# Patient Record
Sex: MALE | Race: WHITE | NOT HISPANIC OR LATINO | Employment: FULL TIME | URBAN - METROPOLITAN AREA
[De-identification: names, ages, dates, MRNs, and addresses within clinical notes are randomized per-mention and may not be internally consistent; named-entity substitution may affect disease eponyms.]

---

## 2017-01-06 ENCOUNTER — OFFICE VISIT (OUTPATIENT)
Dept: INTERNAL MEDICINE | Facility: CLINIC | Age: 37
End: 2017-01-06

## 2017-01-06 VITALS
SYSTOLIC BLOOD PRESSURE: 122 MMHG | DIASTOLIC BLOOD PRESSURE: 88 MMHG | TEMPERATURE: 98.6 F | RESPIRATION RATE: 12 BRPM | BODY MASS INDEX: 25.62 KG/M2 | HEART RATE: 66 BPM | OXYGEN SATURATION: 99 % | WEIGHT: 179 LBS | HEIGHT: 70 IN

## 2017-01-06 DIAGNOSIS — L30.9 ECZEMA, UNSPECIFIED TYPE: ICD-10-CM

## 2017-01-06 DIAGNOSIS — E78.5 DYSLIPIDEMIA: ICD-10-CM

## 2017-01-06 DIAGNOSIS — Z00.00 HEALTHCARE MAINTENANCE: Primary | ICD-10-CM

## 2017-01-06 DIAGNOSIS — E55.9 AVITAMINOSIS D: ICD-10-CM

## 2017-01-06 DIAGNOSIS — N40.3 NODULAR PROSTATE WITH LOWER URINARY TRACT SYMPTOMS: ICD-10-CM

## 2017-01-06 PROCEDURE — 99395 PREV VISIT EST AGE 18-39: CPT | Performed by: INTERNAL MEDICINE

## 2017-01-06 RX ORDER — MELATONIN
1000 DAILY
Qty: 30 TABLET | Refills: 5
Start: 2017-01-06 | End: 2018-04-03

## 2017-01-06 NOTE — MR AVS SNAPSHOT
Eriberto Cleveland   1/6/2017 3:20 PM   Office Visit    Dept Phone:  [phone]   Encounter #:  [id number]    Provider:  Dada Hernandez MD   Department:  [facility] INTERNAL MEDICINE                Your Full Care Plan              Today's Medication Changes          These changes are accurate as of: 1/6/17  4:26 PM.  If you have any questions, ask your nurse or doctor.               New Medication(s)Ordered:     cholecalciferol 1000 UNITS tablet   Commonly known as:  VITAMIN D3   Take 1 tablet by mouth Daily.   Started by:  Dada Hernandez MD            Where to Get Your Medications      Information about where to get these medications is not yet available     ! Ask your nurse or doctor about these medications     cholecalciferol 1000 UNITS tablet                  Your Updated Medication List          This list is accurate as of: 1/6/17  4:26 PM.  Always use your most recent med list.                cholecalciferol 1000 UNITS tablet   Commonly known as:  VITAMIN D3   Take 1 tablet by mouth Daily.               You Were Diagnosed With        Codes Comments    Healthcare maintenance    -  Primary ICD-10-CM: Z00.00  ICD-9-CM: V70.0     Asymmetric prostate     ICD-10-CM: N42.89  ICD-9-CM: 602.8     Avitaminosis D     ICD-10-CM: E55.9  ICD-9-CM: 268.9     Eczema, unspecified type     ICD-10-CM: L30.9  ICD-9-CM: 692.9     Dyslipidemia     ICD-10-CM: E78.5  ICD-9-CM: 272.4       Instructions     None    Patient Instructions History      Upcoming Appointments     Visit Type Date Time Department    PHYSICAL 1/6/2017  3:20 PM [facility]      [facility] Signup     [facility] allows you to send messages to your doctor, view your test results, renew your prescriptions, schedule appointments, and more. To sign up, go to [employer] and click on the Sign Up Now link in the New User? box. Enter your [facility] Activation Code exactly as it appears below along with the  "last four digits of your Social Security Number and your Date of Birth () to complete the sign-up process. If you do not sign up before the expiration date, you must request a new code.    [employer] Activation Code: [email or web address]  Expires: 2017  4:26 PM    If you have questions, you can email [email or web address] or call [phone] to talk to our [employer] staff. Remember, [employer] is NOT to be used for urgent needs. For medical emergencies, dial 911.               Other Info from Your Visit           Your Appointments     2018  8:00 AM EST   [facility] with [facility]   [facility] INTERNAL MEDICINE (--)    [address] [facility] [place] [id number]   [phone]            2018  1:00 PM EST   Physical with Dada Hernandez MD   [facility] INTERNAL MEDICINE (--)    [address] [facility] [place] [id number]   [phone]           Arrive 15 minutes prior to appointment.              Allergies     No Known Allergies      Reason for Visit     Annual Exam review of medical issues       Vital Signs     Blood Pressure Pulse Temperature Respirations Height Weight    122/88 66 98.6 °F (37 °C) 12 70\" (177.8 cm) 179 lb (81.2 kg)    Oxygen Saturation Body Mass Index Smoking Status             99% 25.68 kg/m2 Former Smoker         Problems and Diagnoses Noted     Asymmetric prostate    Avitaminosis D    Eczema    Dyslipidemia    Routine medical exam      No Longer an Issue     Painful prostate        "

## 2017-01-06 NOTE — PROGRESS NOTES
"Annual Exam (review of medical issues )      HPI  Eriberto Cleveland is a 36 y.o. male RTC In yearly CPE, review of medical issues:   1. Vitamin D deficiency - low on labs in past.  Took 1000 I.U. Vitamin D3 OTC daily for a while and then stopped.   2. Eczema on R > L arm -  Notes resolves within a day with triamcinolone ointment when sx occur.  Sx occur maybe once monthly.  Thinks is topical trigger, \"I think it is an allergen to clothing or a detergent\".     3. Prostate Nodule - on R lobe. Saw urology 4/ 2016. Told benign and f/u in one year.  Urinating well, no blood.   4. Dyslipidemia, low HDL - notes diet is better, but no exercise. Goal for 2017 is to \"get into shape and stay there\".        Review of Systems   Constitution: Negative for chills, fever, malaise/fatigue, weight gain and weight loss.   HENT: Negative for congestion, headaches, hearing loss, odynophagia and sore throat.    Eyes: Negative for discharge, double vision, pain and redness.        Last eye exam 2016; wears contacts     Cardiovascular: Negative for chest pain, dyspnea on exertion, irregular heartbeat, leg swelling, near-syncope, palpitations and syncope.   Respiratory: Negative for cough and shortness of breath.    Skin: Positive for suspicious lesions (birthmark on back, present as long as pt can remember. Worried well after friend got diagnosed with skin cancer.  ).   Musculoskeletal: Negative for joint pain, joint swelling, muscle cramps and muscle weakness.   Gastrointestinal: Negative for constipation, diarrhea, dysphagia, heartburn, nausea and vomiting.   Genitourinary: Positive for hesitancy. Negative for dysuria, hematuria and nocturia. Urgency: mild hesitancy, some dribbling.   Neurological: Negative for dizziness and light-headedness.   Psychiatric/Behavioral: Negative for depression. The patient does not have insomnia and is not nervous/anxious.        Problem List:    Patient Active Problem List   Diagnosis   • Dyslipidemia   • " Asymmetric prostate   • Avitaminosis D   • Dermatitis, eczematoid   • Healthcare maintenance       Medical History:    Past Medical History   Diagnosis Date   • Asymmetric prostate    • Dyslipidemia      low hdl diet/exericse mgmnt rec'd   • Eczematous dermatitis      R>>L arm recurrent   • History of viral meningitis      2012   • Prostate nodule    • Vitamin D deficiency         Social History:    Social History     Social History   • Marital status:      Spouse name: N/A   • Number of children: 2   • Years of education: N/A     Occupational History   • Manager       [employer]     Social History Main Topics   • Smoking status: Former Smoker   • Smokeless tobacco: Not on file      Comment: 8 pk/yr hx quit at age 25   • Alcohol use Not on file      Comment: 4-6 drinks month   • Drug use: No   • Sexual activity: Yes     Partners: Female      Comment: wife only; no hx STD's     Other Topics Concern   • Not on file     Social History Narrative    Diet - overall healthy, eating fruits and vegetables; gave up fast food    Exercise - None       Family History:   Family History   Problem Relation Age of Onset   • Hyperlipidemia Father    • Hypertension Father    • Hypertension Brother    • Heart attack Paternal Grandfather        Surgical History: History reviewed. No pertinent past surgical history.      Current Outpatient Prescriptions:   •  cholecalciferol (VITAMIN D3) 1000 UNITS tablet, Take 1 tablet by mouth Daily., Disp: 30 tablet, Rfl: 5    Vitals:    01/06/17 1526   BP: 122/88   Pulse: 66   Resp: 12   Temp: 98.6 °F (37 °C)   SpO2: 99%       Physical Exam   Constitutional: He is oriented to person, place, and time. He appears well-developed and well-nourished. He is cooperative.   HENT:   Head: Normocephalic and atraumatic.   Right Ear: Hearing, tympanic membrane, external ear and ear canal normal.   Left Ear: Hearing, tympanic membrane, external ear and ear canal normal.   Nose: Nose normal.    Mouth/Throat: Uvula is midline, oropharynx is clear and moist and mucous membranes are normal.   Eyes: Conjunctivae, EOM and lids are normal. Pupils are equal, round, and reactive to light.   Neck: Normal range of motion and full passive range of motion without pain. Neck supple. Carotid bruit is not present. No thyroid mass and no thyromegaly present.   Cardiovascular: Normal rate, regular rhythm, S1 normal, S2 normal, normal heart sounds and intact distal pulses.  Exam reveals no gallop and no friction rub.    No murmur heard.  Pulses:       Radial pulses are 2+ on the right side, and 2+ on the left side.        Dorsalis pedis pulses are 2+ on the right side, and 2+ on the left side.        Posterior tibial pulses are 2+ on the right side, and 2+ on the left side.   Pulmonary/Chest: Effort normal and breath sounds normal. No respiratory distress. He has no wheezes. He has no rhonchi. He has no rales.   Abdominal: Soft. Bowel sounds are normal. He exhibits no distension and no mass. There is no hepatosplenomegaly. There is no tenderness. There is no rebound and no guarding.   Musculoskeletal: Normal range of motion. He exhibits no edema.       Vascular Status -  His exam exhibits right foot vasculature abnormal and no right foot edema. His exam exhibits left foot vasculature abnormal and no left foot edema.  Lymphadenopathy:     He has no cervical adenopathy.     He has no axillary adenopathy.        Right: No inguinal adenopathy present.        Left: No inguinal adenopathy present.   Neurological: He is alert and oriented to person, place, and time. He has normal strength and normal reflexes. He displays no tremor. No cranial nerve deficit or sensory deficit. He exhibits normal muscle tone. Gait normal.   Skin: Skin is warm, dry and intact. No rash noted.        Psychiatric: He has a normal mood and affect. His speech is normal and behavior is normal. Cognition and memory are normal.   Vitals  reviewed.      Assessment/ Plan  Diagnoses and all orders for this visit:    Healthcare maintenance    Nodular prostate with lower urinary tract symptoms    Avitaminosis D  -     cholecalciferol (VITAMIN D3) 1000 UNITS tablet; Take 1 tablet by mouth Daily.    Eczema, unspecified type    Dyslipidemia        Return in about 1 year (around 1/6/2018) for Annual physical.      Discussion:  Eriberto Cleveland is a 36 y.o. male RTC In yearly CPE, review of medical issues:   1. Vitamin D deficiency - persists on labs.  Start on 1000 I.U. Vitamin D3 OTC daily.   2. Eczema on R > L arm - resolves within a day with triamcinolone ointment when sx occur. Asx'ic at this time.   3. Prostate Asymmetry on R - s/p urology eval 4/ 2016, note reviewed today.  F/U planned one year with PSA.   4. Dyslipidemia, low HDL - overall panel improved with diet mods, however need more HDL modification. Add CV exercise.  Trend lipids next labs.    5. HM - labs d/w pt; Flu / Tdap UTD; ANDERSON per urology; add exercise.     RTC one year CPE, F labs prior

## 2017-02-13 ENCOUNTER — TELEPHONE (OUTPATIENT)
Dept: INTERNAL MEDICINE | Facility: CLINIC | Age: 37
End: 2017-02-13

## 2017-02-13 DIAGNOSIS — L30.9 ECZEMA, UNSPECIFIED TYPE: Primary | ICD-10-CM

## 2017-02-13 NOTE — TELEPHONE ENCOUNTER
Pt called and stated that he was here 2 weeks ago for a cpe and you all talked about an ointment for his skin rash. He went home to see if he had any and he does not. He is wanting to know if you can call in an rx for him. You prescribed him triamcinolone acetonide at his September 2015 visit.

## 2018-01-10 DIAGNOSIS — E55.9 AVITAMINOSIS D: ICD-10-CM

## 2018-01-10 DIAGNOSIS — Z00.00 HEALTHCARE MAINTENANCE: Primary | ICD-10-CM

## 2018-01-10 DIAGNOSIS — E78.5 DYSLIPIDEMIA: ICD-10-CM

## 2018-03-29 LAB
25(OH)D3+25(OH)D2 SERPL-MCNC: 21.4 NG/ML (ref 30–100)
ALBUMIN SERPL-MCNC: 5 G/DL (ref 3.5–5.2)
ALBUMIN/GLOB SERPL: 2.3 G/DL
ALP SERPL-CCNC: 58 U/L (ref 39–117)
ALT SERPL-CCNC: 21 U/L (ref 1–41)
APPEARANCE UR: CLEAR
AST SERPL-CCNC: 18 U/L (ref 1–40)
BACTERIA #/AREA URNS HPF: NORMAL /HPF
BASOPHILS # BLD AUTO: 0.04 10*3/MM3 (ref 0–0.2)
BASOPHILS NFR BLD AUTO: 0.6 % (ref 0–1.5)
BILIRUB SERPL-MCNC: 0.8 MG/DL (ref 0.1–1.2)
BILIRUB UR QL STRIP: NEGATIVE
BUN SERPL-MCNC: 13 MG/DL (ref 6–20)
BUN/CREAT SERPL: 14.1 (ref 7–25)
CALCIUM SERPL-MCNC: 9.8 MG/DL (ref 8.6–10.5)
CHLORIDE SERPL-SCNC: 101 MMOL/L (ref 98–107)
CHOLEST SERPL-MCNC: 221 MG/DL (ref 0–200)
CO2 SERPL-SCNC: 27.2 MMOL/L (ref 22–29)
COLOR UR: YELLOW
CREAT SERPL-MCNC: 0.92 MG/DL (ref 0.76–1.27)
EOSINOPHIL # BLD AUTO: 0.18 10*3/MM3 (ref 0–0.7)
EOSINOPHIL NFR BLD AUTO: 2.8 % (ref 0.3–6.2)
EPI CELLS #/AREA URNS HPF: NORMAL /HPF
ERYTHROCYTE [DISTWIDTH] IN BLOOD BY AUTOMATED COUNT: 14 % (ref 11.5–14.5)
GFR SERPLBLD CREATININE-BSD FMLA CKD-EPI: 112 ML/MIN/1.73
GFR SERPLBLD CREATININE-BSD FMLA CKD-EPI: 93 ML/MIN/1.73
GLOBULIN SER CALC-MCNC: 2.2 GM/DL
GLUCOSE SERPL-MCNC: 99 MG/DL (ref 65–99)
GLUCOSE UR QL: NEGATIVE
HCT VFR BLD AUTO: 48.2 % (ref 40.4–52.2)
HDLC SERPL-MCNC: 40 MG/DL (ref 40–60)
HGB BLD-MCNC: 16.3 G/DL (ref 13.7–17.6)
HGB UR QL STRIP: NEGATIVE
IMM GRANULOCYTES # BLD: 0.02 10*3/MM3 (ref 0–0.03)
IMM GRANULOCYTES NFR BLD: 0.3 % (ref 0–0.5)
KETONES UR QL STRIP: NEGATIVE
LDLC SERPL CALC-MCNC: 139 MG/DL (ref 0–100)
LEUKOCYTE ESTERASE UR QL STRIP: NEGATIVE
LYMPHOCYTES # BLD AUTO: 1.12 10*3/MM3 (ref 0.9–4.8)
LYMPHOCYTES NFR BLD AUTO: 17.3 % (ref 19.6–45.3)
MCH RBC QN AUTO: 28.8 PG (ref 27–32.7)
MCHC RBC AUTO-ENTMCNC: 33.8 G/DL (ref 32.6–36.4)
MCV RBC AUTO: 85.2 FL (ref 79.8–96.2)
MICRO URNS: NORMAL
MICRO URNS: NORMAL
MONOCYTES # BLD AUTO: 0.64 10*3/MM3 (ref 0.2–1.2)
MONOCYTES NFR BLD AUTO: 9.9 % (ref 5–12)
MUCOUS THREADS URNS QL MICRO: PRESENT /HPF
NEUTROPHILS # BLD AUTO: 4.49 10*3/MM3 (ref 1.9–8.1)
NEUTROPHILS NFR BLD AUTO: 69.1 % (ref 42.7–76)
NITRITE UR QL STRIP: NEGATIVE
PH UR STRIP: 7 [PH] (ref 5–7.5)
PLATELET # BLD AUTO: 284 10*3/MM3 (ref 140–500)
POTASSIUM SERPL-SCNC: 4.5 MMOL/L (ref 3.5–5.2)
PROT SERPL-MCNC: 7.2 G/DL (ref 6–8.5)
PROT UR QL STRIP: NEGATIVE
RBC # BLD AUTO: 5.66 10*6/MM3 (ref 4.6–6)
RBC #/AREA URNS HPF: NORMAL /HPF
SODIUM SERPL-SCNC: 143 MMOL/L (ref 136–145)
SP GR UR: 1.02 (ref 1–1.03)
TRIGL SERPL-MCNC: 208 MG/DL (ref 0–150)
TSH SERPL DL<=0.005 MIU/L-ACNC: 1.15 MIU/ML (ref 0.27–4.2)
URINALYSIS REFLEX: NORMAL
UROBILINOGEN UR STRIP-MCNC: 0.2 MG/DL (ref 0.2–1)
VLDLC SERPL CALC-MCNC: 41.6 MG/DL (ref 5–40)
WBC # BLD AUTO: 6.49 10*3/MM3 (ref 4.5–10.7)
WBC #/AREA URNS HPF: NORMAL /HPF

## 2018-04-03 ENCOUNTER — OFFICE VISIT (OUTPATIENT)
Dept: INTERNAL MEDICINE | Facility: CLINIC | Age: 38
End: 2018-04-03

## 2018-04-03 VITALS
HEIGHT: 70 IN | BODY MASS INDEX: 25.62 KG/M2 | TEMPERATURE: 99.2 F | RESPIRATION RATE: 14 BRPM | HEART RATE: 91 BPM | WEIGHT: 179 LBS | SYSTOLIC BLOOD PRESSURE: 120 MMHG | OXYGEN SATURATION: 98 % | DIASTOLIC BLOOD PRESSURE: 70 MMHG

## 2018-04-03 DIAGNOSIS — B07.8 COMMON WART: ICD-10-CM

## 2018-04-03 DIAGNOSIS — Z00.00 HEALTHCARE MAINTENANCE: Primary | ICD-10-CM

## 2018-04-03 DIAGNOSIS — E55.9 AVITAMINOSIS D: ICD-10-CM

## 2018-04-03 DIAGNOSIS — N42.89 ASYMMETRIC PROSTATE: ICD-10-CM

## 2018-04-03 DIAGNOSIS — E78.5 DYSLIPIDEMIA: ICD-10-CM

## 2018-04-03 PROCEDURE — 99395 PREV VISIT EST AGE 18-39: CPT | Performed by: INTERNAL MEDICINE

## 2018-04-03 PROCEDURE — 17110 DESTRUCTION B9 LES UP TO 14: CPT | Performed by: INTERNAL MEDICINE

## 2018-04-03 NOTE — PROGRESS NOTES
"Annual Exam (review of medical issues )      HPI  Eriberto Cleveland is a 37 y.o. male RTC In yearly CPE, review of medical issues:  Has been doing well.   1. Vitamin D deficiency - persisted on labs last year. Has not taken much Vitamin D over year.  Forgets to take it. Rare sun expsure.   2. Eczema on R > L arm - noted last year, \"it is gone\". No recurrence.   3. Prostate Asymmetry on R - s/p urology eval 4/ 2016, note reviewed today.  F/U planned one year with PSA.   4. Dyslipidemia, low HDL - had issues in past.  Notes taht \"got a little bit inspired by blood work\". Notes wife is real health eater and \"I am going to start being real healthy eater with her\".  Notes plans to cut out fast food. Eats some sweets sometimes.  Exercise is challenging. Plans some accountability with wife for exercise.  Did first day last night.  Thinks will be doing cardio workout program that is [place] based.  Plans to do some light weights.          Review of Systems   Constitution: Negative for chills, fever, malaise/fatigue, weight gain and weight loss.   HENT: Negative for congestion, hearing loss, odynophagia and sore throat.    Eyes: Negative for discharge, double vision, pain and redness.        Last eye exam 2/2018; wears contacts     Cardiovascular: Negative for chest pain, dyspnea on exertion, irregular heartbeat, leg swelling, near-syncope, palpitations and syncope.   Respiratory: Negative for cough and shortness of breath.    Hematologic/Lymphatic: Negative for bleeding problem. Does not bruise/bleed easily.   Skin: Positive for suspicious lesions (wart on R ring finger, ripped off, has some mild recurrence.  ). Negative for rash.   Musculoskeletal: Negative for arthritis, joint pain, joint swelling, muscle cramps, muscle weakness and myalgias.   Gastrointestinal: Negative for constipation, diarrhea, dysphagia, heartburn, nausea and vomiting.   Genitourinary: Negative for dysuria, frequency, hematuria, hesitancy and nocturia. "   Neurological: Negative for dizziness, headaches and light-headedness.   Psychiatric/Behavioral: Negative for depression. The patient does not have insomnia and is not nervous/anxious.        Problem List:    Patient Active Problem List   Diagnosis   • Dyslipidemia   • Asymmetric prostate   • Avitaminosis D       Medical History:    Past Medical History:   Diagnosis Date   • Asymmetric prostate    • Dermatitis, eczematoid 12/27/2016    Description: R >> L arm, recurrent   • Dyslipidemia     low hdl diet/exericse mgmnt rec'd   • Eczematous dermatitis     R>>L arm recurrent   • History of viral meningitis     2012   • Prostate nodule    • Vitamin D deficiency         Social History:    Social History     Social History   • Marital status:      Spouse name: N/A   • Number of children: 2   • Years of education: N/A     Occupational History   • Manager       [employer]     Social History Main Topics   • Smoking status: Former Smoker   • Smokeless tobacco: Never Used      Comment: 8 pk/yr hx quit at age 25   • Alcohol use Not on file      Comment: 4-6 drinks month   • Drug use: No   • Sexual activity: Yes     Partners: Female      Comment: wife only; no hx STD's     Other Topics Concern   • Not on file     Social History Narrative    Diet - overall healthy, eating fruits and vegetables; gave up fast food; 2018 diet improved with inspiration    Exercise - None; 2018 start cardio and light weight routine    Caffeine - past energy drinks       Family History:   Family History   Problem Relation Age of Onset   • Hyperlipidemia Father    • Hypertension Father    • Hypertension Brother    • Heart attack Paternal Grandfather    • No Known Problems Son        Surgical History: History reviewed. No pertinent surgical history.    No current outpatient prescriptions on file.    Vitals:    04/03/18 1535   BP: 120/70   Pulse: 91   Resp: 14   Temp: 99.2 °F (37.3 °C)   SpO2: 98%         Physical Exam   Constitutional: He  is oriented to person, place, and time. He appears well-developed and well-nourished. He is cooperative. No distress.   HENT:   Head: Normocephalic and atraumatic.   Right Ear: Hearing, tympanic membrane, external ear and ear canal normal.   Left Ear: Hearing, tympanic membrane, external ear and ear canal normal.   Nose: Nose normal.   Mouth/Throat: Uvula is midline, oropharynx is clear and moist and mucous membranes are normal. No oropharyngeal exudate.   Eyes: Conjunctivae, EOM and lids are normal. Pupils are equal, round, and reactive to light. Right eye exhibits no discharge. Left eye exhibits no discharge.   Neck: Normal range of motion and full passive range of motion without pain. Neck supple. Carotid bruit is not present. No thyroid mass and no thyromegaly present.   Cardiovascular: Normal rate, regular rhythm, S1 normal, S2 normal, normal heart sounds and intact distal pulses.  Exam reveals no gallop and no friction rub.    No murmur heard.  Pulses:       Radial pulses are 2+ on the right side, and 2+ on the left side.        Dorsalis pedis pulses are 2+ on the right side, and 2+ on the left side.        Posterior tibial pulses are 2+ on the right side, and 2+ on the left side.   Pulmonary/Chest: Effort normal and breath sounds normal. No respiratory distress. He has no wheezes. He has no rhonchi. He has no rales.   Abdominal: Soft. Bowel sounds are normal. He exhibits no distension and no mass. There is no hepatosplenomegaly. There is no tenderness. There is no rebound and no guarding.   Musculoskeletal: Normal range of motion. He exhibits no edema.     Vascular Status -  His right foot exhibits normal foot vasculature  and no edema. His left foot exhibits normal foot vasculature  and no edema.  Skin Integrity  -  His right foot skin is intact.His left foot skin is intact..  Lymphadenopathy:     He has no cervical adenopathy.     He has no axillary adenopathy. No inguinal adenopathy noted on the right or  "left side.        Right: No inguinal adenopathy present.        Left: No inguinal adenopathy present.   Neurological: He is alert and oriented to person, place, and time. He has normal strength and normal reflexes. He displays no tremor. No cranial nerve deficit or sensory deficit. He exhibits normal muscle tone. Gait normal.   Skin: Skin is warm, dry and intact. No rash noted.        Psychiatric: He has a normal mood and affect. His speech is normal and behavior is normal. Cognition and memory are normal.   Vitals reviewed.    Destruction of Lesion  Date/Time: 4/3/2018 4:26 PM  Performed by: ZOHREH VALLE  Authorized by: ZOHREH VALLE   Consent: Verbal consent obtained.  Risks and benefits: risks, benefits and alternatives were discussed  Local anesthesia used: no    Anesthesia:  Local anesthesia used: no    Sedation:  Patient sedated: no  Patient tolerance: Patient tolerated the procedure well with no immediate complications  Comments: ~30 seconds of cryotherapy applied to lesion on R 4th finger with good freezing obtained.          Assessment/ Plan  Diagnoses and all orders for this visit:    Healthcare maintenance    Asymmetric prostate    Avitaminosis D    Dyslipidemia    Common wart  Comments:  R ring finger    Other orders  -     Destruction of Lesion        Return in about 1 year (around 4/3/2019) for Annual physical.      Discussion:  Eriberto Cleveland is a 37 y.o. male RTC In yearly CPE, review of medical issues:   1. Vitamin D deficiency - persisted on labs  Restart 1000 I.U. Vitamin D3 OTC daily.   2. Common wart, R ring finger - new issue today, partially scratched off but has verrucous base on exam.   3. Prostate Asymmetry on R - s/p urology eval 4/ 2016 and 6/2017, note reviewed today.  Tracking PSA and exam, no prior bx done.   4. Dyslipidemia, low HDL - 10 yr CR 1.7%.\"Inspired by blood work\" and pt has made changes with wife of improved diet and start of home exercise program.  C/W efforts and will " trend lipids over time. No statin indicated at this time.   5. HM - labs d/w pt; Flu / Tdap UTD; Hep A discussed, declines vaccine today;  ANDERSON per urology; c/w exercise addition.     RTC one year CPE, F labs prior

## 2018-10-15 ENCOUNTER — OFFICE VISIT (OUTPATIENT)
Dept: INTERNAL MEDICINE | Facility: CLINIC | Age: 38
End: 2018-10-15

## 2018-10-15 VITALS
TEMPERATURE: 98.9 F | BODY MASS INDEX: 24.05 KG/M2 | DIASTOLIC BLOOD PRESSURE: 70 MMHG | OXYGEN SATURATION: 98 % | WEIGHT: 168 LBS | HEART RATE: 73 BPM | HEIGHT: 70 IN | SYSTOLIC BLOOD PRESSURE: 108 MMHG

## 2018-10-15 DIAGNOSIS — R20.0 LOSS OF FEELING OR SENSATION: ICD-10-CM

## 2018-10-15 DIAGNOSIS — R20.0 RIGHT LEG NUMBNESS: Primary | ICD-10-CM

## 2018-10-15 DIAGNOSIS — R29.898 RIGHT LEG WEAKNESS: ICD-10-CM

## 2018-10-15 PROCEDURE — 99214 OFFICE O/P EST MOD 30 MIN: CPT | Performed by: INTERNAL MEDICINE

## 2018-10-15 PROCEDURE — 72110 X-RAY EXAM L-2 SPINE 4/>VWS: CPT | Performed by: INTERNAL MEDICINE

## 2018-10-15 RX ORDER — MELOXICAM 15 MG/1
15 TABLET ORAL DAILY
Qty: 14 TABLET | Refills: 0 | Status: SHIPPED | OUTPATIENT
Start: 2018-10-15 | End: 2018-11-05 | Stop reason: SDUPTHER

## 2018-10-15 NOTE — PROGRESS NOTES
"Pain (Poss pinched nerve ) and Leg Pain (Right leg x 2 wks )      HPI  Eriberto Cleveland is a 38 y.o. male  RTC in acute care:   \"I have a self diagnosed pinched nerve, that is my internet dx\".  Pt notes that about 2 weeks ago was squatting for exercise with weights. Wife walked in room and started talking to him and he lost form.  No pain at time, but woke up the next AM with numb R leg.  Notes since then has had variable sx. \"Right now I have no pain... It is fine\".  At times will have burning pain in R leg, intermittently.  However, has numbness that is present 'all the time' in outer upper leg only. No sx in lower leg below knee.   Pt called uncle who is a physician last week and he Rx'd a Dose Pack and pt has been taking 2 Ibuprofen BID.  However, \"I am still numb\".  Feels like has \"some \" weakness, \"to be honest, I would say some of that\".  Feels like walking stairs muscles in R leg are \"tired feeling\".      Review of Systems   Constitution: Positive for weight loss (since last visit as has been exercising and \"eating right\". ).   Skin: Negative for rash and suspicious lesions.   Musculoskeletal: Negative for back pain, joint pain, joint swelling and muscle weakness.   Gastrointestinal: Negative for bowel incontinence.   Genitourinary: Negative for bladder incontinence.   Neurological: Positive for focal weakness (in R leg, with stairs ), numbness and paresthesias. Negative for brief paralysis.       The following portions of the patient's history were reviewed and updated as appropriate: allergies, current medications, past medical history and problem list.      Current Outpatient Prescriptions:   •  Loratadine-Pseudoephedrine (CLARITIN-D 12 HOUR PO), Take  by mouth., Disp: , Rfl:   •  meloxicam (MOBIC) 15 MG tablet, Take 1 tablet by mouth Daily., Disp: 14 tablet, Rfl: 0    Vitals:    10/15/18 1432   BP: 108/70   Pulse: 73   Temp: 98.9 °F (37.2 °C)   SpO2: 98%   Weight: 76.2 kg (168 lb)   Height: 177.8 cm (70\") "         Physical Exam   Constitutional: He is oriented to person, place, and time. He appears well-developed and well-nourished. No distress.   HENT:   Head: Normocephalic and atraumatic.   Eyes: Pupils are equal, round, and reactive to light.   Neck: Normal range of motion. Neck supple. Carotid bruit is not present.   Cardiovascular:   Pulses:       Carotid pulses are 2+ on the right side, and 2+ on the left side.       Radial pulses are 2+ on the right side, and 2+ on the left side.   Pulmonary/Chest: Effort normal and breath sounds normal. No respiratory distress. He has no wheezes. He has no rales.   Musculoskeletal: He exhibits no edema.        Right hip: He exhibits normal range of motion, normal strength, no tenderness and no bony tenderness.        Left hip: He exhibits normal range of motion, normal strength, no tenderness and no bony tenderness.        Right knee: He exhibits normal range of motion, no swelling and no effusion. No tenderness found. No lateral joint line and no LCL tenderness noted.        Lumbar back: He exhibits normal range of motion, no tenderness, no bony tenderness and no pain.   Neurological: He is alert and oriented to person, place, and time. He has normal strength. He displays no atrophy. A sensory deficit (slight loss of sensation to monofilament on R lower lateral thigh and up to mid thigh) is present. No cranial nerve deficit. He exhibits normal muscle tone. Gait normal.   Reflex Scores:       Patellar reflexes are 2+ on the right side and 2+ on the left side.       Achilles reflexes are 2+ on the right side and 2+ on the left side.  5/5 BLE strength proximal and distal     Skin: No rash noted. No erythema.   Psychiatric: He has a normal mood and affect. His behavior is normal.   Vitals reviewed.    XR lumbar: R thigh tingling; No prior for comparison   Normal alignment  No fracture  Facet joints intact  Normal disc spaces.     Assessment/ Plan  Diagnoses and all orders for  this visit:    Right leg numbness  -     XR Spine Lumbar 4+ View (In Office)  -     MRI Lumbar Spine Without Contrast; Future  -     meloxicam (MOBIC) 15 MG tablet; Take 1 tablet by mouth Daily.    Right leg weakness  -     XR Spine Lumbar 4+ View (In Office)  -     MRI Lumbar Spine Without Contrast; Future  -     meloxicam (MOBIC) 15 MG tablet; Take 1 tablet by mouth Daily.    Loss of feeling or sensation  -     MRI Lumbar Spine Without Contrast; Future  -     meloxicam (MOBIC) 15 MG tablet; Take 1 tablet by mouth Daily.    Other orders  -     Loratadine-Pseudoephedrine (CLARITIN-D 12 HOUR PO); Take  by mouth.        Return for Next scheduled follow up.      Discussion:  Eriberto Cleveland is a 38 y.o. male RTC in acute care (new issue to examiner) with 2 weeks of sudden onset persistent R outer thigh numbness with intermittent burning pain with subtle subjective weakness on R leg. NOt responsive to steroid dose pack from family member and low dose NSAIDs.   Exam is largely benign except for noted incomplete loss of monofilament sensation on outer, lower R thigh. Ddx is lumbar nerve impingement vs. Less likely meralgia paresthetica (atypcial pt) vs. IT Band (hx not totally compatible.  I think given sx and loss of sensation, additional imaging is warranted at this time with MRI lumbar spine.  XR lumbar spine is unremarkable in office.  Will have pt change to Meloxicam 15mg daily x 14 days for additional anti-inflammatory effect. Will f/u with pt via phone after MRI.

## 2018-10-25 ENCOUNTER — APPOINTMENT (OUTPATIENT)
Dept: MRI IMAGING | Facility: HOSPITAL | Age: 38
End: 2018-10-25

## 2018-10-31 ENCOUNTER — APPOINTMENT (OUTPATIENT)
Dept: MRI IMAGING | Facility: HOSPITAL | Age: 38
End: 2018-10-31

## 2018-11-05 ENCOUNTER — HOSPITAL ENCOUNTER (OUTPATIENT)
Dept: MRI IMAGING | Facility: HOSPITAL | Age: 38
Discharge: HOME | End: 2018-11-05
Admitting: INTERNAL MEDICINE

## 2018-11-05 DIAGNOSIS — R29.898 RIGHT LEG WEAKNESS: ICD-10-CM

## 2018-11-05 DIAGNOSIS — R20.0 LOSS OF FEELING OR SENSATION: ICD-10-CM

## 2018-11-05 DIAGNOSIS — R20.0 RIGHT LEG NUMBNESS: ICD-10-CM

## 2018-11-05 PROCEDURE — 72148 MRI LUMBAR SPINE W/O DYE: CPT

## 2018-11-12 ENCOUNTER — TELEPHONE (OUTPATIENT)
Dept: INTERNAL MEDICINE | Facility: CLINIC | Age: 38
End: 2018-11-12

## 2018-11-12 DIAGNOSIS — R20.0 RIGHT LEG NUMBNESS: ICD-10-CM

## 2018-11-12 DIAGNOSIS — R20.0 LOSS OF FEELING OR SENSATION: ICD-10-CM

## 2018-11-12 DIAGNOSIS — R20.0 NUMBNESS OF RIGHT ANTERIOR THIGH: ICD-10-CM

## 2018-11-12 DIAGNOSIS — R29.898 RIGHT LEG WEAKNESS: ICD-10-CM

## 2018-11-12 DIAGNOSIS — M51.36 DDD (DEGENERATIVE DISC DISEASE), LUMBAR: Primary | ICD-10-CM

## 2018-11-13 NOTE — TELEPHONE ENCOUNTER
Reviewed results with pt who notes he reviewed this with friend in [facility] here at [facility], [place]. Apparently, was reviewed by [facility]??? WIll place call and review with [facility] as pt is still having sx and MRI does not explain sx.     Lesli, Place call to Maryjo Judge in [employer] so I may speak with her.

## 2018-11-15 PROBLEM — M51.36 DDD (DEGENERATIVE DISC DISEASE), LUMBAR: Status: ACTIVE | Noted: 2018-11-15

## 2018-11-15 PROBLEM — R20.0 NUMBNESS OF RIGHT ANTERIOR THIGH: Status: ACTIVE | Noted: 2018-11-15

## 2018-11-15 PROBLEM — M51.369 DDD (DEGENERATIVE DISC DISEASE), LUMBAR: Status: ACTIVE | Noted: 2018-11-15

## 2018-11-15 RX ORDER — MELOXICAM 15 MG/1
15 TABLET ORAL DAILY
Qty: 14 TABLET | Refills: 0 | Status: SHIPPED | OUTPATIENT
Start: 2018-11-15 | End: 2018-12-03 | Stop reason: SDUPTHER

## 2018-11-28 ENCOUNTER — TREATMENT (OUTPATIENT)
Dept: PHYSICAL THERAPY | Facility: CLINIC | Age: 38
End: 2018-11-28

## 2018-11-28 DIAGNOSIS — M51.36 DDD (DEGENERATIVE DISC DISEASE), LUMBAR: Primary | ICD-10-CM

## 2018-11-28 DIAGNOSIS — R20.0 NUMBNESS OF RIGHT ANTERIOR THIGH: ICD-10-CM

## 2018-11-28 PROCEDURE — 97110 THERAPEUTIC EXERCISES: CPT | Performed by: PHYSICAL THERAPIST

## 2018-11-28 PROCEDURE — 97161 PT EVAL LOW COMPLEX 20 MIN: CPT | Performed by: PHYSICAL THERAPIST

## 2018-11-28 PROCEDURE — 97140 MANUAL THERAPY 1/> REGIONS: CPT | Performed by: PHYSICAL THERAPIST

## 2018-11-28 PROCEDURE — 97014 ELECTRIC STIMULATION THERAPY: CPT | Performed by: PHYSICAL THERAPIST

## 2018-11-28 NOTE — PROGRESS NOTES
Physical Therapy Initial Evaluation and Plan of Care         Patient: Eriberto Cleveland   : 1980  Diagnosis/ICD-10 Code:  DDD (degenerative disc disease), lumbar [M51.36]  Referring practitioner: Dada Hernandez MD  Date of Initial Visit: 2018  Today's Date: 2018  Patient seen for 1 sessions           Subjective Questionnaire: LEFS: 88%      Subjective Evaluation    History of Present Illness  Onset date: 1.5 months.  Mechanism of injury: No injury when pain started.  He reports he was squatting with weight that day and turned while in the squatting position but did not feel pain.  Continued working out.  That night lying in bed.  Started to get burning in right thigh.  Constant numbness and tingling.   Started in front of thigh around hip flexor.  Has slowly moved to lower lateral quad.  No change with position.  No change with back positioning.  Full strength and ROM in knee and hip.    Subjective comment: Prior to injury:  basketball, running, weights, kids. Ok to walk.  Not walking him up.  (-) valsalva. (-) bowel/bladder.  No prior injuries to any LE. Took a dose pack.  Saw MD: xrays and MRI ordered, given Meloxicam.  Severe pain is gone now but still constant.  Treated nerve pain in leg with intense stretching.  Currently lifting weights upper body, stretching lower body, yoga.  Can't run due to pain. Intense burning if not on the Meloxicam.  Patient Occupation: Desk job Pain  Current pain ratin (with meloxicam)  At worst pain ratin  Location: Started in upper lateral leg, now lower leg.   Quality: burning  Relieving factors: medications (hasn't tried ice or heat)  Aggravating factors: stairs (running, things in pocket, sitting longer than 30 minutes, car riding)  Progression: no change    Social Support  Lives with: spouse and young children    Diagnostic Tests  X-ray: normal  MRI studies: abnormal (L4/5: A small disc protrusion at the right neuroforamen )    Treatments  Previous  treatment: medication  Current treatment: medication and physical therapy  Patient Goals  Patient goals for therapy: decreased pain, increased strength and return to sport/leisure activities  Patient goal: really want to get back to normal activities           Objective     Special Questions      Additional Special Questions  No night pain, no bowel/bladder dysfunction.      Observations     Additional Observation Details  Superior iliac crest and ASIS, inferior PSIS.    Palpation     Right Tenderness of the iliopsoas, piriformis and rectus femoris.     Right Hip Palpation Comments   Iliopsoas: psoas.     Tenderness     Additional Tenderness Details  No lumbar tenderness.   Hypersensitivity at right lateral thigh.    Neurological Testing     Sensation     Lumbar   Left   Intact: light touch    Right   Diminished: light touch    Comments   Right light touch: distal lateral leg right with tingling    Reflexes   Left   Patellar (L4): normal (2+)  Achilles (S1): normal (2+)    Right   Patellar (L4): normal (2+)  Achilles (S1): normal (2+)    Active Range of Motion     Lumbar   Flexion: WFL  Extension: WFL  Left lateral flexion: WFL  Right lateral flexion: WFL  Left rotation: WFL  Right rotation: WFL    Right Hip   Flexion: WFL  Extension: WFL  Abduction: WFL  External rotation (90/90): Right hip active external rotation 90/90: lacks 10 degrees.   Internal rotation (prone): Right hip active internal rotation prone: lacks 10 degrees.     Additional Active Range of Motion Details  No pain.    Strength/Myotome Testing     Lumbar   Left   Normal strength  Heel walk: normal  Toe walk: normal    Right   Heel walk: normal  Toe walk: normal    Right Hip   Planes of Motion   Flexion: 4+  Abduction: 4  External rotation: 4  Internal rotation: 4    Right Knee   Flexion: 4+  Extension: 5    Right Ankle/Foot   Dorsiflexion: 4+  Plantar flexion: 5  Eversion: 5  Great toe extension: 4+    Tests     Lumbar     Right   Positive femoral  stretch.   Negative crossed SLR, passive SLR, lumbar push, reverse leg-raising, valsalva and vertical compression.     Right Pelvic Girdle/Sacrum   Negative: sacrum compression, gapping, sacral spring and thigh thrust.     Right Hip   Positive femoral nerve tension and piriformis.   Negative Gaenslen's, long sit, Jeffy, scour, SI compression and SI distraction.   Carlos: Positive.   SLR: Negative.     Additional Tests Details  Mild pain with right posterior quadrant AROM and vertical compression.  (-) slump  Back relief with manual traction, no change in paresthesias.         Assessment & Plan     Assessment  Impairments: abnormal muscle firing, activity intolerance, impaired physical strength, pain with function and weight-bearing intolerance  Assessment details: Patient is a 37 yo male with fairly insidious onset of right anterior thigh paresthesias.  Lumbar spine is non tender and patient has full ROM.  Patient has tenderness in the sciatic and iliopsoas area with weakness noted in the hip and L4,5 myotomes.  Reflexes, slump test, and SLR are normal. Based on the above findings patient is a good candidate for treatment to decrease symptoms, improve core stability, and return to full function.  Prognosis: good  Functional Limitations: lifting, sleeping, walking, uncomfortable because of pain and sitting  Goals  Plan Goals: STG X 2 weeks  1.  Tolerate initial HEP.  2.  Decrease pain with prolonged sitting by 25%.  3.  Increase strength 1/2 grade right hip.  LTG X 6 weeks  1.  5/5 right LE.  2.  Return to ADL's and work activities with tolerable symptoms.  3.  Tolerate return to LE strengthening with increased LE symptoms.  4.  Walking 30 minutes with no increased symptoms.    Plan  Therapy options: will be seen for skilled physical therapy services  Planned modality interventions: ultrasound, electrical stimulation/Russian stimulation, traction and thermotherapy (hydrocollator packs)  Planned therapy interventions:  abdominal trunk stabilization, manual therapy, neuromuscular re-education, postural training, flexibility, functional ROM exercises, home exercise program, therapeutic activities, stretching and strengthening  Frequency: 2x week  Duration in visits: 12  Treatment plan discussed with: patient        Manual Therapy:    8     mins  47239;  Therapeutic Exercise:    15     mins  44972;     Neuromuscular Flaco:         mins  38379;    Therapeutic Activity:           mins  96988;     Gait Training:            mins  94808;     Ultrasound:           mins  43693;    Electrical Stimulation:    15     mins  10486 ( );  Dry Needling           mins self-pay    Timed Treatment:   23   mins   Total Treatment:     75   mins    PT SIGNATURE: Frida Garcia, PT   DATE TREATMENT INITIATED: 11/28/2018    Initial Certification  Certification Period: 2/26/2019  I certify that the therapy services are furnished while this patient is under my care.  The services outlined above are required by this patient, and will be reviewed every 90 days.     PHYSICIAN: Dada Hernandez MD      DATE:     Please sign and return via fax to [phone]. Thank you, [facility].

## 2018-11-28 NOTE — PATIENT INSTRUCTIONS
Patient was educated on findings of evaluation, purpose of treatment, and goals for therapy.  Treatment options discussed and questions answered.  Patient was educated on exercises/self treatment/pain relief techniques.  Please view [facility] Eriberto Cleveland for a complete list of HEP instructions.

## 2018-12-03 DIAGNOSIS — R20.0 RIGHT LEG NUMBNESS: ICD-10-CM

## 2018-12-03 DIAGNOSIS — R29.898 RIGHT LEG WEAKNESS: ICD-10-CM

## 2018-12-03 DIAGNOSIS — R20.0 LOSS OF FEELING OR SENSATION: ICD-10-CM

## 2018-12-03 RX ORDER — MELOXICAM 15 MG/1
15 TABLET ORAL DAILY
Qty: 14 TABLET | Refills: 0 | Status: SHIPPED | OUTPATIENT
Start: 2018-12-03 | End: 2019-04-09

## 2018-12-05 ENCOUNTER — TREATMENT (OUTPATIENT)
Dept: PHYSICAL THERAPY | Facility: CLINIC | Age: 38
End: 2018-12-05

## 2018-12-05 DIAGNOSIS — M51.36 DDD (DEGENERATIVE DISC DISEASE), LUMBAR: Primary | ICD-10-CM

## 2018-12-05 DIAGNOSIS — R20.0 NUMBNESS OF RIGHT ANTERIOR THIGH: ICD-10-CM

## 2018-12-05 PROCEDURE — 97110 THERAPEUTIC EXERCISES: CPT | Performed by: PHYSICAL THERAPIST

## 2018-12-05 PROCEDURE — 97530 THERAPEUTIC ACTIVITIES: CPT | Performed by: PHYSICAL THERAPIST

## 2018-12-05 PROCEDURE — 97012 MECHANICAL TRACTION THERAPY: CPT | Performed by: PHYSICAL THERAPIST

## 2018-12-05 NOTE — PROGRESS NOTES
Physical Therapy Daily Progress Note         Visit # : 2  Eriberto Cleveland reports: no change yet in leg.  No problem with exercises.  Back felt really good after traction.  No increased pain in back or lg.    Subjective     Objective   See Exercise, Manual, and Modality Logs for complete treatment.   Thoracic discomfort with left LTR.    Assessment/Plan  Challenged with hip strengthening.  Relief with traction at low back.  Decreased anterior hip tenderness with stretching this week and improvement to full, painfree ROM.  Progress strengthening /stabilization /functional activity  Assess traction and strengthening exercises.  Add plank, sideplank with clam, prone or quad hip extension         Manual Therapy:          mins  25728;  Therapeutic Exercise:    35     mins  97146;     Neuromuscular Flaco:         mins  93586;    Therapeutic Activity:     10      mins  20077;     Gait Training:            mins  74949;     Ultrasound:           mins  55586;    Electrical Stimulation:          mins  99664 ( );  Dry Needling           mins self-pay    Timed Treatment:   45   mins   Total Treatment:     70   mins    Frida Garcia, PT  Physical Therapist

## 2018-12-10 ENCOUNTER — TREATMENT (OUTPATIENT)
Dept: PHYSICAL THERAPY | Facility: CLINIC | Age: 38
End: 2018-12-10

## 2018-12-10 DIAGNOSIS — M51.36 DDD (DEGENERATIVE DISC DISEASE), LUMBAR: Primary | ICD-10-CM

## 2018-12-10 DIAGNOSIS — R20.0 NUMBNESS OF RIGHT ANTERIOR THIGH: ICD-10-CM

## 2018-12-10 PROCEDURE — 97110 THERAPEUTIC EXERCISES: CPT | Performed by: PHYSICAL THERAPIST

## 2018-12-10 PROCEDURE — 97012 MECHANICAL TRACTION THERAPY: CPT | Performed by: PHYSICAL THERAPIST

## 2018-12-10 NOTE — PROGRESS NOTES
Physical Therapy Daily Progress Note         Visit # : 3  Eriberto Cleveland reports: feeling stronger but tingling in leg is same.  Nerve feels a little more irritated with exercises but my back feels good with the strengthening.    Subjective     Objective   See Exercise, Manual, and Modality Logs for complete treatment.   (-) seated and supine SLR  (+) femoral nerve stretch  Increased neural tension and pain with lunge position.    Assessment/Plan  Added prone femoral nerve glides along with iliopsoas stretching to regain painfree lunge position.  Mild neurotension remains only in femoral nerve.  Progressing well with stability and tolerates traction well.  Progress per Plan of Care  Change hip ext to quadraped alt leg  Add palloff - stand or double kneel         Manual Therapy:    5     mins  26949;  Therapeutic Exercise:    30     mins  88366;     Neuromuscular Flaco:         mins  64720;    Therapeutic Activity:           mins  13351;     Gait Training:            mins  69030;     Ultrasound:           mins  37695;    Electrical Stimulation:          mins  12245 ( );  Dry Needling           mins self-pay    Timed Treatment:   35   mins   Total Treatment:     62   mins    Frida Garcia, PT  Physical Therapist

## 2018-12-19 ENCOUNTER — TREATMENT (OUTPATIENT)
Dept: PHYSICAL THERAPY | Facility: CLINIC | Age: 38
End: 2018-12-19

## 2018-12-19 DIAGNOSIS — R20.0 NUMBNESS OF RIGHT ANTERIOR THIGH: ICD-10-CM

## 2018-12-19 DIAGNOSIS — M51.36 DDD (DEGENERATIVE DISC DISEASE), LUMBAR: Primary | ICD-10-CM

## 2018-12-19 PROCEDURE — 97012 MECHANICAL TRACTION THERAPY: CPT | Performed by: PHYSICAL THERAPIST

## 2018-12-19 PROCEDURE — 97140 MANUAL THERAPY 1/> REGIONS: CPT | Performed by: PHYSICAL THERAPIST

## 2018-12-19 PROCEDURE — 97110 THERAPEUTIC EXERCISES: CPT | Performed by: PHYSICAL THERAPIST

## 2018-12-19 NOTE — PROGRESS NOTES
Physical Therapy Daily Progress Note        Visit # : 4  Eriberto Cleveland reports: walked 40 minutes, after 20 minutes burning started severely in lateral leg.  Subsided after ~5 minutes.  Stopped NSAIDS and has not had any increased pain.  I used to not be able to go a day without severe pain.  I can carry things in my pocket and they don't bother me much now.  I didn't use to be able to tolerate my phone or keys rubbing against my leg. After traction last visit, relief from the burning. Hip is actually most sore.    Subjective     Objective   See Exercise, Manual, and Modality Logs for complete treatment.   TTP right TFL  (+) right Betty    Assessment/Plan  Noted increased tightness and hypersensitivity along TFL and hip flexor.  No tenderness after manual treatment.  Decreased paresthesias, decreased intensity of pain, decreased tenderness at hip and area of paresthesias.  Progress strengthening /stabilization /functional activity  Assess IASTM, add hip ext to quadraped, add palloff stand or double kneel         Manual Therapy:    10     mins  96558;  Therapeutic Exercise:    10     mins  22475;     Neuromuscular Flaco:         mins  25073;    Therapeutic Activity:      5     mins  46332;     Gait Training:            mins  47110;     Ultrasound:           mins  78256;    Electrical Stimulation:          mins  74177 ( );  Dry Needling           mins self-pay    Timed Treatment:   25   mins   Total Treatment:     50   mins    Frida Garcia, PT  Physical Therapist

## 2018-12-21 ENCOUNTER — TREATMENT (OUTPATIENT)
Dept: PHYSICAL THERAPY | Facility: CLINIC | Age: 38
End: 2018-12-21

## 2018-12-21 DIAGNOSIS — R20.0 NUMBNESS OF RIGHT ANTERIOR THIGH: ICD-10-CM

## 2018-12-21 DIAGNOSIS — M51.36 DDD (DEGENERATIVE DISC DISEASE), LUMBAR: Primary | ICD-10-CM

## 2018-12-21 PROCEDURE — 97140 MANUAL THERAPY 1/> REGIONS: CPT | Performed by: PHYSICAL THERAPIST

## 2018-12-21 PROCEDURE — 97012 MECHANICAL TRACTION THERAPY: CPT | Performed by: PHYSICAL THERAPIST

## 2018-12-21 PROCEDURE — 97110 THERAPEUTIC EXERCISES: CPT | Performed by: PHYSICAL THERAPIST

## 2018-12-21 NOTE — PROGRESS NOTES
Physical Therapy Daily Progress Note       Visit # : 5  Eriberto Cleveland reports: no increased pain, I just feel a little in my leg.  Haven't had a chance to walk again because of rain so no increase in pain. No pain meds and no pain since stopping.    Subjective     Objective   See Exercise, Manual, and Modality Logs for complete treatment.   Sartorius 4+/5  ER 4+/5  Glutt med 4/5  Hip add 4+/5  Hip flexion 5/5    Assessment/Plan  Improved strength in right LE with weakness in hip abduction most notable.  Long discussion with patient regarding healing process with nerve.  Overall patient continues to improve with only mild residual paresthesias remaining.   Progress per Plan of Care  Assess new exercises, increase walking as tolerated start with flat and increase terrain as able.  Add squats, hip flexion in standing to progress towards return to hiking.         Manual Therapy:    10     mins  84120;  Therapeutic Exercise:    30     mins  26050;     Neuromuscular Flaco:         mins  74173;    Therapeutic Activity:           mins  94332;     Gait Training:            mins  70414;     Ultrasound:           mins  44143;    Electrical Stimulation:          mins  32848 ( );  Dry Needling           mins self-pay    Timed Treatment:   40   mins   Total Treatment:     58   mins    Frida Garcia, PT  Physical Therapist

## 2019-03-27 DIAGNOSIS — E55.9 AVITAMINOSIS D: ICD-10-CM

## 2019-03-27 DIAGNOSIS — Z00.00 HEALTHCARE MAINTENANCE: Primary | ICD-10-CM

## 2019-03-27 DIAGNOSIS — N42.89 ASYMMETRIC PROSTATE: ICD-10-CM

## 2019-03-27 DIAGNOSIS — E78.5 DYSLIPIDEMIA: ICD-10-CM

## 2019-04-05 LAB
25(OH)D3+25(OH)D2 SERPL-MCNC: 24.6 NG/ML (ref 30–100)
ALBUMIN SERPL-MCNC: 4.6 G/DL (ref 3.5–5.2)
ALBUMIN/GLOB SERPL: 1.8 G/DL
ALP SERPL-CCNC: 52 U/L (ref 39–117)
ALT SERPL-CCNC: 26 U/L (ref 1–41)
APPEARANCE UR: CLEAR
AST SERPL-CCNC: 16 U/L (ref 1–40)
BACTERIA #/AREA URNS HPF: NORMAL /HPF
BASOPHILS # BLD AUTO: 0.05 10*3/MM3 (ref 0–0.2)
BASOPHILS NFR BLD AUTO: 1 % (ref 0–1.5)
BILIRUB SERPL-MCNC: 0.4 MG/DL (ref 0.2–1.2)
BILIRUB UR QL STRIP: NEGATIVE
BUN SERPL-MCNC: 15 MG/DL (ref 6–20)
BUN/CREAT SERPL: 17 (ref 7–25)
CALCIUM SERPL-MCNC: 9.8 MG/DL (ref 8.6–10.5)
CHLORIDE SERPL-SCNC: 104 MMOL/L (ref 98–107)
CHOLEST SERPL-MCNC: 179 MG/DL (ref 0–200)
CO2 SERPL-SCNC: 26.2 MMOL/L (ref 22–29)
COLOR UR: YELLOW
CREAT SERPL-MCNC: 0.88 MG/DL (ref 0.76–1.27)
EOSINOPHIL # BLD AUTO: 0.24 10*3/MM3 (ref 0–0.4)
EOSINOPHIL NFR BLD AUTO: 4.8 % (ref 0.3–6.2)
EPI CELLS #/AREA URNS HPF: NORMAL /HPF
ERYTHROCYTE [DISTWIDTH] IN BLOOD BY AUTOMATED COUNT: 14.2 % (ref 12.3–15.4)
GLOBULIN SER CALC-MCNC: 2.5 GM/DL
GLUCOSE SERPL-MCNC: 121 MG/DL (ref 65–99)
GLUCOSE UR QL: NEGATIVE
HCT VFR BLD AUTO: 48.2 % (ref 37.5–51)
HDLC SERPL-MCNC: 40 MG/DL (ref 40–60)
HGB BLD-MCNC: 15.9 G/DL (ref 13–17.7)
HGB UR QL STRIP: NEGATIVE
IMM GRANULOCYTES # BLD AUTO: 0.02 10*3/MM3 (ref 0–0.05)
IMM GRANULOCYTES NFR BLD AUTO: 0.4 % (ref 0–0.5)
KETONES UR QL STRIP: NEGATIVE
LDLC SERPL CALC-MCNC: 114 MG/DL (ref 0–100)
LEUKOCYTE ESTERASE UR QL STRIP: NEGATIVE
LYMPHOCYTES # BLD AUTO: 1.02 10*3/MM3 (ref 0.7–3.1)
LYMPHOCYTES NFR BLD AUTO: 20.5 % (ref 19.6–45.3)
MCH RBC QN AUTO: 28 PG (ref 26.6–33)
MCHC RBC AUTO-ENTMCNC: 33 G/DL (ref 31.5–35.7)
MCV RBC AUTO: 85 FL (ref 79–97)
MICRO URNS: NORMAL
MICRO URNS: NORMAL
MONOCYTES # BLD AUTO: 0.58 10*3/MM3 (ref 0.1–0.9)
MONOCYTES NFR BLD AUTO: 11.6 % (ref 5–12)
MUCOUS THREADS URNS QL MICRO: PRESENT /HPF
NEUTROPHILS # BLD AUTO: 3.07 10*3/MM3 (ref 1.4–7)
NEUTROPHILS NFR BLD AUTO: 61.7 % (ref 42.7–76)
NITRITE UR QL STRIP: NEGATIVE
NRBC BLD AUTO-RTO: 0 /100 WBC (ref 0–0)
PH UR STRIP: 7 [PH] (ref 5–7.5)
PLATELET # BLD AUTO: 301 10*3/MM3 (ref 140–450)
POTASSIUM SERPL-SCNC: 4.5 MMOL/L (ref 3.5–5.2)
PROT SERPL-MCNC: 7.1 G/DL (ref 6–8.5)
PROT UR QL STRIP: NEGATIVE
RBC # BLD AUTO: 5.67 10*6/MM3 (ref 4.14–5.8)
RBC #/AREA URNS HPF: NORMAL /HPF
SODIUM SERPL-SCNC: 143 MMOL/L (ref 136–145)
SP GR UR: 1.02 (ref 1–1.03)
T4 FREE SERPL-MCNC: NORMAL NG/DL
TRIGL SERPL-MCNC: 127 MG/DL (ref 0–150)
TSH SERPL DL<=0.005 MIU/L-ACNC: 1.61 MIU/ML (ref 0.27–4.2)
URINALYSIS REFLEX: NORMAL
UROBILINOGEN UR STRIP-MCNC: 0.2 MG/DL (ref 0.2–1)
VLDLC SERPL CALC-MCNC: 25.4 MG/DL (ref 5–40)
WBC # BLD AUTO: 4.98 10*3/MM3 (ref 3.4–10.8)
WBC #/AREA URNS HPF: NORMAL /HPF

## 2019-04-09 ENCOUNTER — OFFICE VISIT (OUTPATIENT)
Dept: INTERNAL MEDICINE | Facility: CLINIC | Age: 39
End: 2019-04-09

## 2019-04-09 VITALS
DIASTOLIC BLOOD PRESSURE: 70 MMHG | TEMPERATURE: 98.3 F | HEIGHT: 70 IN | BODY MASS INDEX: 23.77 KG/M2 | OXYGEN SATURATION: 99 % | RESPIRATION RATE: 12 BRPM | HEART RATE: 80 BPM | SYSTOLIC BLOOD PRESSURE: 120 MMHG | WEIGHT: 166 LBS

## 2019-04-09 DIAGNOSIS — N42.89 ASYMMETRIC PROSTATE: ICD-10-CM

## 2019-04-09 DIAGNOSIS — D18.09 HEMANGIOMA OF SPINE: ICD-10-CM

## 2019-04-09 DIAGNOSIS — Z00.00 HEALTHCARE MAINTENANCE: Primary | ICD-10-CM

## 2019-04-09 DIAGNOSIS — R20.0 NUMBNESS OF RIGHT ANTERIOR THIGH: ICD-10-CM

## 2019-04-09 DIAGNOSIS — M51.36 DDD (DEGENERATIVE DISC DISEASE), LUMBAR: ICD-10-CM

## 2019-04-09 DIAGNOSIS — E55.9 AVITAMINOSIS D: ICD-10-CM

## 2019-04-09 DIAGNOSIS — R93.7 ABNORMAL MRI, LUMBAR SPINE: ICD-10-CM

## 2019-04-09 PROCEDURE — 99395 PREV VISIT EST AGE 18-39: CPT | Performed by: INTERNAL MEDICINE

## 2019-04-09 PROCEDURE — 90471 IMMUNIZATION ADMIN: CPT | Performed by: INTERNAL MEDICINE

## 2019-04-09 PROCEDURE — 90632 HEPA VACCINE ADULT IM: CPT | Performed by: INTERNAL MEDICINE

## 2019-04-09 RX ORDER — CHOLECALCIFEROL (VITAMIN D3) 25 MCG
CAPSULE ORAL
COMMUNITY

## 2019-04-09 NOTE — PROGRESS NOTES
"Annual Exam (review of medical issues)      HPI  Eriberto Cleveland is a 38 y.o. male RTC in yearly CPE, review of medical issues:   1.  Sudden onset persistent R outer thigh numbness with intermittent burning pain 10/2018 - has largely resolved with no pain. Completed PT and was pleased with progress with resolved pain.  Still has focal area of numbness in R lower thigh laterally but no pain. Has maybe \"a little\" sensation.  Feels \"numb and sensitive\" at same time. Can feel fingers touch area but feels like \"it is touching something that is asleep\".  Is off PT but still doing yoga 5x/ week, recalls starting prior to onset of sx.  Thinks original injury was during exercise, but thinks yoga does help issue overall.  Feels like has some asymmetry in strength due to compensation while was having pain/ sx.   2. Vitamin D deficiency - still on 1000 I.U. Vitamin D3 OTC daily.   3. Common wart, R ring finger - resolved over last year. \"it is gone\".  Cryotherapy last alvarado.   4. Prostate Asymmetry on R - s/p urology eval 4/ 2016 and 6/2017.     Review of Systems   Constitution: Positive for weight loss (intentional with diet and exercise). Negative for chills, fever and malaise/fatigue.   HENT: Negative for congestion, hearing loss, odynophagia and sore throat.    Eyes: Negative for discharge, double vision, pain and redness.        Last eye exam~8/2018; wears contacts     Cardiovascular: Negative for chest pain, dyspnea on exertion, irregular heartbeat, near-syncope, palpitations and syncope.   Respiratory: Negative for cough and shortness of breath.    Endocrine: Negative for polydipsia, polyphagia and polyuria.   Hematologic/Lymphatic: Negative for bleeding problem. Does not bruise/bleed easily.   Skin: Negative for rash and suspicious lesions.   Musculoskeletal: Negative for joint pain, joint swelling, muscle cramps, muscle weakness and myalgias.   Gastrointestinal: Negative for bloating, abdominal pain, constipation, " diarrhea, dysphagia, heartburn, nausea and vomiting.   Genitourinary: Negative for dysuria, frequency, hematuria and hesitancy.   Neurological: Negative for dizziness, headaches and light-headedness.   Psychiatric/Behavioral: Negative for depression and substance abuse. The patient does not have insomnia.    Allergic/Immunologic: Negative for environmental allergies and persistent infections.       Problem List:    Patient Active Problem List   Diagnosis   • Dyslipidemia   • Asymmetric prostate   • Avitaminosis D   • DDD (degenerative disc disease), lumbar   • Numbness of right anterior thigh   • Hemangioma of spine       Medical History:    Past Medical History:   Diagnosis Date   • Asymmetric prostate    • Dermatitis, eczematoid 12/27/2016    Description: R >> L arm, recurrent   • Dyslipidemia     low hdl diet/exericse mgmnt rec'd   • Eczematous dermatitis     R>>L arm recurrent   • History of viral meningitis     2012   • Prostate nodule    • Vitamin D deficiency         Social History:    Social History     Socioeconomic History   • Marital status:      Spouse name: Not on file   • Number of children: 2   • Years of education: Not on file   • Highest education level: Not on file   Occupational History   • Occupation: Manager      Comment: [employer]   Tobacco Use   • Smoking status: Former Smoker   • Smokeless tobacco: Never Used   • Tobacco comment: 8 pk/yr hx quit at age 25   Substance and Sexual Activity   • Alcohol use: Yes     Comment: 4-6 drinks month   • Drug use: No   • Sexual activity: Yes     Partners: Female     Comment: wife only; no hx STD's   Lifestyle   • Physical activity:     Days per week: 5 days     Minutes per session: 60 min   • Stress: Not on file   Social History Narrative    Diet - overall healthy, eating fruits and vegetables; gave up fast food; 2018 diet improved with inspiration and achieved weight loss    Exercise - None; 2018 start cardio and light weight routine -->  2019 started yoga 5x/ week and some walk/ weights other days.     Caffeine - past energy drinks; 1 cup coffee in AM       Family History:   Family History   Problem Relation Age of Onset   • Hyperlipidemia Father    • Hypertension Father    • Hypertension Brother    • Heart attack Paternal Grandfather    • No Known Problems Son        Surgical History: History reviewed. No pertinent surgical history.      Current Outpatient Medications:   •  Cholecalciferol (VITAMIN D-3) 1000 units capsule, Take  by mouth., Disp: , Rfl:     Vitals:    04/09/19 1308   BP: 120/70   Pulse: 80   Resp: 12   Temp: 98.3 °F (36.8 °C)   SpO2: 99%       Physical Exam   Constitutional: He is oriented to person, place, and time. He appears well-developed and well-nourished. He is cooperative. No distress.   HENT:   Head: Normocephalic and atraumatic.   Right Ear: Hearing, tympanic membrane, external ear and ear canal normal.   Left Ear: Hearing, tympanic membrane, external ear and ear canal normal.   Nose: Nose normal.   Mouth/Throat: Uvula is midline, oropharynx is clear and moist and mucous membranes are normal. No oropharyngeal exudate.   Eyes: Conjunctivae, EOM and lids are normal. Pupils are equal, round, and reactive to light. Right eye exhibits no discharge. Left eye exhibits no discharge. No scleral icterus.   Neck: Normal range of motion and full passive range of motion without pain. Neck supple. Carotid bruit is not present. No thyroid mass and no thyromegaly present.   Cardiovascular: Normal rate, regular rhythm, S1 normal, S2 normal and normal heart sounds. Exam reveals no gallop and no friction rub.   No murmur heard.  Pulses:       Radial pulses are 2+ on the right side, and 2+ on the left side.        Dorsalis pedis pulses are 2+ on the right side, and 2+ on the left side.        Posterior tibial pulses are 2+ on the right side, and 2+ on the left side.   Pulmonary/Chest: Effort normal and breath sounds normal. No respiratory  distress. He has no wheezes. He has no rhonchi. He has no rales.   Abdominal: Soft. Bowel sounds are normal. He exhibits no distension and no mass. There is no hepatosplenomegaly. There is no tenderness. There is no rebound and no guarding.   Genitourinary: Rectum normal and prostate normal. Prostate is not enlarged and not tender.   Musculoskeletal: Normal range of motion. He exhibits no edema.     Vascular Status -  His right foot exhibits normal foot vasculature  and no edema. His left foot exhibits normal foot vasculature  and no edema.  Skin Integrity  -  His right foot skin is intact.His left foot skin is intact..  Lymphadenopathy:     He has no cervical adenopathy.     He has no axillary adenopathy.        Right: No inguinal adenopathy present.        Left: No inguinal adenopathy present.   Neurological: He is alert and oriented to person, place, and time. He has normal strength and normal reflexes. He displays no tremor. A sensory deficit (minimal loss of monofilament at R lateral knee ) is present. No cranial nerve deficit. He exhibits normal muscle tone. Gait normal.   Reflex Scores:       Patellar reflexes are 2+ on the right side and 2+ on the left side.       Achilles reflexes are 2+ on the right side and 2+ on the left side.  Skin: Skin is warm, dry and intact. No rash noted.   Psychiatric: He has a normal mood and affect. His speech is normal and behavior is normal. Cognition and memory are normal.   Vitals reviewed.      Assessment/ Plan  Diagnoses and all orders for this visit:    Healthcare maintenance  -     Hepatitis A Vaccine Adult IM    Asymmetric prostate    Avitaminosis D    DDD (degenerative disc disease), lumbar    Numbness of right anterior thigh  -     MRI Lumbar Spine Without Contrast; Future    Hemangioma of spine  -     MRI Lumbar Spine Without Contrast; Future    Abnormal MRI, lumbar spine  -     MRI Lumbar Spine Without Contrast; Future    Other orders  -     Cholecalciferol (VITAMIN  D-3) 1000 units capsule; Take  by mouth.        Return in about 1 year (around 4/9/2020) for Annual physical.      Discussion:  Eriberto Cleveland is a 38 y.o. male RTC in yearly CPE, review of medical issues:   1.  Sudden onset persistent R outer thigh numbness with intermittent burning pain 10/2018 - has largely resolved with no pain remaining after PT completed and short course of NSAIDs. Has remnant numbness at location, but no pain.  Recall MRI lumbar spine showed likely hemangioma at S1 space, could not r/o metastatic disease with T1 signal noted.  Will proceed with 6 month interval f/u MRI given pt has some remnant numbness and hx of asymmetric prostate.  Otherwise, will monitor numbness for right now given improved overall with PT/ conservative care.   Right aspect of the S1 segment, 1.3 cm focus of increased T2 signal, mildly increased T1 signal, probably representing hemangioma, although increase of T1  2. Vitamin D deficiency - improved,on 1000 I.U. Vitamin D3 OTC daily. May increase to 2000 I.U. Daily and trend levels next year.   3. Common wart, R ring finger - resolved over last year after cryotherapy. Monitor.   4. Prostate Asymmetry on R - s/p urology eval 4/ 2016 and 6/2017, note reviewed today.     5. Dyslipidemia, with hx low HDL - improved numbers overall with diet and exercise mods. No statin indicated.   6. HM - labs d/w pt; Flu - next season; Tdap UTD; Hep A #1 today;  ANDERSON per urology; c/w exercise addition.    RTC one year CPE, F labs prior   Hep A #2 in 6 months

## 2019-04-19 ENCOUNTER — HOSPITAL ENCOUNTER (OUTPATIENT)
Dept: MRI IMAGING | Facility: HOSPITAL | Age: 39
Discharge: HOME | End: 2019-04-19
Admitting: INTERNAL MEDICINE

## 2019-04-19 DIAGNOSIS — D18.09 HEMANGIOMA OF SPINE: ICD-10-CM

## 2019-04-19 DIAGNOSIS — R20.0 NUMBNESS OF RIGHT ANTERIOR THIGH: ICD-10-CM

## 2019-04-19 DIAGNOSIS — R93.7 ABNORMAL MRI, LUMBAR SPINE: ICD-10-CM

## 2019-04-19 PROCEDURE — 72148 MRI LUMBAR SPINE W/O DYE: CPT

## 2019-10-14 ENCOUNTER — PATIENT MESSAGE (OUTPATIENT)
Dept: INTERNAL MEDICINE | Facility: CLINIC | Age: 39
End: 2019-10-14

## 2019-10-24 NOTE — PROGRESS NOTES
Subjective   Patient ID: Eriberto Cleveland is a 39 y.o. male is being seen for consultation today at the request of Dada Hernandez MD for intermittent bilateral lower extremity pain in his hips down to his knee. He has been doing physical therapy for a year now. He does not have any back pain much at all. He does not take anything for pain. He has tried NSAID'S previously.    Leg Pain    The incident occurred more than 1 week ago. The incident occurred at the gym. There was no injury mechanism (Squatting injury). The pain is present in the right knee, left hip, left knee, left thigh, right thigh and right hip. The quality of the pain is described as burning (Sharp). The pain is at a severity of 4/10. The pain is moderate. The pain has been constant since onset. Associated symptoms include numbness and tingling. Pertinent negatives include no inability to bear weight or muscle weakness. He has tried heat, ice, NSAIDs and rest (Physical therapy) for the symptoms. The treatment provided no relief.   Back Pain   This is a recurrent problem. The current episode started more than 1 year ago. The problem occurs intermittently. The problem has been gradually improving since onset. The pain is present in the lumbar spine. The quality of the pain is described as aching. The pain is at a severity of 2/10. The pain is mild. Associated symptoms include leg pain, numbness and tingling. Pertinent negatives include no abdominal pain, bladder incontinence, bowel incontinence, chest pain, dysuria, fever, headaches, paresis, paresthesias, pelvic pain, perianal numbness, weakness or weight loss. He has tried home exercises, NSAIDs and walking for the symptoms. The treatment provided moderate relief.       The following portions of the patient's history were reviewed and updated as appropriate: allergies, current medications, past family history, past medical history, past social history, past surgical history and problem list.    Review of  Systems   Constitutional: Negative for activity change, fever and weight loss.   Respiratory: Negative for chest tightness and shortness of breath.    Cardiovascular: Negative for chest pain.   Gastrointestinal: Negative for abdominal pain and bowel incontinence.   Genitourinary: Negative for bladder incontinence, dysuria and pelvic pain.   Musculoskeletal: Positive for arthralgias ( Bilateral hip pain) and myalgias. Negative for back pain.        Bilateral thigh pain   Neurological: Positive for tingling and numbness. Negative for weakness, headaches and paresthesias.   All other systems reviewed and are negative.      Objective   Physical Exam   Constitutional: He is oriented to person, place, and time. He appears well-developed and well-nourished.   HENT:   Head: Normocephalic and atraumatic.   Right Ear: External ear normal.   Left Ear: External ear normal.   Eyes: Conjunctivae and EOM are normal. Pupils are equal, round, and reactive to light. Right eye exhibits no discharge. Left eye exhibits no discharge.   Neck: Normal range of motion. Neck supple. No tracheal deviation present.   Pulmonary/Chest: Effort normal. No stridor. No respiratory distress.   Musculoskeletal: Normal range of motion. He exhibits no edema, tenderness or deformity.   Neurological: He is alert and oriented to person, place, and time. He has normal strength. He displays no atrophy, no tremor and normal reflexes. No cranial nerve deficit or sensory deficit. He exhibits normal muscle tone. He displays a negative Romberg sign. He displays no seizure activity. Coordination and gait normal.   Reflex Scores:       Tricep reflexes are 2+ on the right side and 2+ on the left side.       Bicep reflexes are 2+ on the right side and 2+ on the left side.       Brachioradialis reflexes are 2+ on the right side and 2+ on the left side.       Patellar reflexes are 2+ on the right side and 2+ on the left side.       Achilles reflexes are 2+ on the right  side and 2+ on the left side.  No long tract signs   Skin: Skin is warm and dry.   Psychiatric: He has a normal mood and affect. His behavior is normal. Judgment and thought content normal.   Nursing note and vitals reviewed.    Neurologic Exam     Mental Status   Oriented to person, place, and time.     Cranial Nerves     CN III, IV, VI   Pupils are equal, round, and reactive to light.  Extraocular motions are normal.     Motor Exam     Strength   Strength 5/5 throughout.     Gait, Coordination, and Reflexes     Reflexes   Right brachioradialis: 2+  Left brachioradialis: 2+  Right biceps: 2+  Left biceps: 2+  Right triceps: 2+  Left triceps: 2+  Right patellar: 2+  Left patellar: 2+  Right achilles: 2+  Left achilles: 2+      Assessment/Plan   Independent Review of Radiographic Studies:    I did review both lumbar MRIs in the [facility] system, the most recent of which was in April 2019.  He has very mild degenerative disc disease with mild facet arthropathy at L4-L5 with mild bilateral foraminal narrowing at that level.  There is a very small left-sided disc bulge at L5-S1 that does not appear to be causing any significant stenosis or nerve compression.  There is a hemangioma noted at S1 which have not changed between the 2 scans and appears very consistent with a benign hemangioma.  Medical Decision Making:    Mr. Cleveland came to see us today at the request of Dr. Hernandez for a 1 year history of right lateral hip and lateral thigh numbness/discomfort.  The symptoms began after the patient started doing squats.  Initially the pain was actually very severe and radiated from the low back into the right buttock and lateral thigh.  Thankfully with time and extensive physical therapy as well as home exercises the symptoms have improved but never completely resolved.  At this point he has a mild to moderate constant burning type sensation from the right lateral hip to the right knee.  Over the last few months he has  developed much milder symptoms in the left thigh.  He did again initially have some significant low back pain but after stopping his squatting routine and continuing with extensive yoga and exercise and therapy the back pain has improved significantly.  He will still occasionally have a mild discomfort but nothing constant or severe.  He denies any bowel or bladder dysfunction or weakness.  He does have numbness and tingling in the same distribution as the discomfort.  No gait or balance difficulties.  Prolonged sitting will certainly exacerbate his symptoms.  He runs 2 to 3 miles several days per week and occasionally will have some exacerbation of his symptoms after running but sitting is by far the most exacerbating activity.  He did get relief in the past with meloxicam but would prefer to avoid having to take a daily medication.    His exam does not reveal any focal weakness or long tract signs.    I did review the MRI findings with him.  I think his symptoms are likely referred from the facet arthropathy and mild foraminal narrowing at L4-L5.  We discussed several options including epidural injections, a myelogram or even an EMG/NCS.  He is leaning towards the epidural injections and we discussed the associated risks of bleeding, infection and headache.  He would like to think about it and will call us back if he does wish to proceed.  In that case we will order the injections and have him follow-up thereafter.  Eriberto was seen today for leg pain.    Diagnoses and all orders for this visit:    DDD (degenerative disc disease), lumbar    Lumbar radiculopathy      Return if symptoms worsen or fail to improve, for pt to call if he makes decision on THERESA, EMG/NCS or myelogram.

## 2019-10-25 ENCOUNTER — OFFICE VISIT (OUTPATIENT)
Dept: NEUROSURGERY | Facility: CLINIC | Age: 39
End: 2019-10-25

## 2019-10-25 VITALS
DIASTOLIC BLOOD PRESSURE: 77 MMHG | SYSTOLIC BLOOD PRESSURE: 134 MMHG | HEIGHT: 70 IN | HEART RATE: 80 BPM | BODY MASS INDEX: 23.62 KG/M2 | WEIGHT: 165 LBS

## 2019-10-25 DIAGNOSIS — M51.36 DDD (DEGENERATIVE DISC DISEASE), LUMBAR: Primary | ICD-10-CM

## 2019-10-25 DIAGNOSIS — M54.16 LUMBAR RADICULOPATHY: ICD-10-CM

## 2019-10-25 PROCEDURE — 99243 OFF/OP CNSLTJ NEW/EST LOW 30: CPT | Performed by: PHYSICIAN ASSISTANT

## 2020-03-04 ENCOUNTER — OFFICE VISIT (OUTPATIENT)
Dept: INTERNAL MEDICINE | Facility: CLINIC | Age: 40
End: 2020-03-04

## 2020-03-04 VITALS
SYSTOLIC BLOOD PRESSURE: 120 MMHG | TEMPERATURE: 98.1 F | BODY MASS INDEX: 23.91 KG/M2 | HEIGHT: 70 IN | RESPIRATION RATE: 18 BRPM | DIASTOLIC BLOOD PRESSURE: 82 MMHG | WEIGHT: 167 LBS | HEART RATE: 78 BPM | OXYGEN SATURATION: 99 %

## 2020-03-04 DIAGNOSIS — J02.9 ACUTE PHARYNGITIS, UNSPECIFIED ETIOLOGY: ICD-10-CM

## 2020-03-04 DIAGNOSIS — J02.9 SORE THROAT: Primary | ICD-10-CM

## 2020-03-04 LAB
EXPIRATION DATE: NORMAL
INTERNAL CONTROL: NORMAL
Lab: NORMAL
S PYO AG THROAT QL: NEGATIVE

## 2020-03-04 PROCEDURE — 99213 OFFICE O/P EST LOW 20 MIN: CPT | Performed by: NURSE PRACTITIONER

## 2020-03-04 PROCEDURE — 87880 STREP A ASSAY W/OPTIC: CPT | Performed by: NURSE PRACTITIONER

## 2020-03-04 NOTE — PROGRESS NOTES
Subjective   Eriberto Cleveland is a 39 y.o. male.   Chief Complaint   Patient presents with   • Sore Throat     x 1 day      Vitals:    03/04/20 1310   BP: 120/82   Pulse: 78   Resp: 18   Temp: 98.1 °F (36.7 °C)   SpO2: 99%     No LMP for male patient.    Eriberto is a patient of Dr Hernandez who is here for an acute visit.     Sore Throat    This is a new problem. The current episode started yesterday. The problem has been unchanged. Neither side of throat is experiencing more pain than the other. There has been no fever. The pain is at a severity of 6/10. The pain is moderate. Associated symptoms include ear pain and a plugged ear sensation. Pertinent negatives include no abdominal pain, congestion, coughing, shortness of breath, swollen glands, trouble swallowing or vomiting.         The following portions of the patient's history were reviewed and updated as appropriate: allergies, current medications, past family history, past medical history, past social history, past surgical history and problem list.    Review of Systems   Constitutional: Negative for chills, fatigue and fever.   HENT: Positive for ear pain and sore throat. Negative for congestion, postnasal drip, rhinorrhea and trouble swallowing.    Respiratory: Negative for cough and shortness of breath.    Gastrointestinal: Negative for abdominal pain and vomiting.       Objective   Physical Exam   Constitutional: Vital signs are normal. He appears well-developed and well-nourished. No distress.   HENT:   Head: Normocephalic.   Right Ear: Tympanic membrane normal.   Left Ear: Tympanic membrane normal.   Nose: Rhinorrhea present.   Mouth/Throat: Uvula is midline. Posterior oropharyngeal erythema present. No oropharyngeal exudate.   Cardiovascular: Normal rate, regular rhythm and normal heart sounds.   Pulmonary/Chest: Effort normal and breath sounds normal.   Neurological: He is alert.       Assessment/Plan   Eriberto was seen today for sore throat.    Diagnoses and  all orders for this visit:    Sore throat  -     POC Rapid Strep A    Acute pharyngitis, unspecified etiology      Strep is negative  Continue with symptom treatment for 7-10 days  Tylenol or motrin  Rest and drink plenty of fluids  Salt water gargles as needed  Follow up if your symptoms persist, worsen or if new symptoms develop

## 2020-12-11 ENCOUNTER — OFFICE VISIT (OUTPATIENT)
Dept: INTERNAL MEDICINE | Facility: CLINIC | Age: 40
End: 2020-12-11

## 2020-12-11 VITALS
HEIGHT: 70 IN | SYSTOLIC BLOOD PRESSURE: 120 MMHG | BODY MASS INDEX: 23.62 KG/M2 | DIASTOLIC BLOOD PRESSURE: 80 MMHG | OXYGEN SATURATION: 98 % | WEIGHT: 165 LBS | TEMPERATURE: 98 F | HEART RATE: 71 BPM

## 2020-12-11 DIAGNOSIS — L40.9 PSORIASIS: Primary | ICD-10-CM

## 2020-12-11 DIAGNOSIS — M79.671 FOOT PAIN, BILATERAL: ICD-10-CM

## 2020-12-11 DIAGNOSIS — M79.672 FOOT PAIN, BILATERAL: ICD-10-CM

## 2020-12-11 PROCEDURE — 99214 OFFICE O/P EST MOD 30 MIN: CPT | Performed by: INTERNAL MEDICINE

## 2020-12-11 RX ORDER — CALCIPOTRIENE 50 UG/G
CREAM TOPICAL 2 TIMES DAILY
Qty: 60 G | Refills: 1 | Status: SHIPPED | OUTPATIENT
Start: 2020-12-11 | End: 2020-12-31 | Stop reason: SDUPTHER

## 2020-12-11 NOTE — PROGRESS NOTES
"Rash (months ad getting worse over time) and Foot Pain (right months right ball of foot)      HPI  Eriberto Cleveland is a 40 y.o. male RTC In acute care:  \"Several of these loose ends I need to get tied up\".   1. Rash - present for months.  Cannot seem to get rid of it.  Tried to eliminate gluten from diet, no help. Used some triamcinolone cream left over and note helping. Rash is progressive.   Rash is just on back of elbows B.  Noted scaling in last few weeks.    Itchiness is biggest issue. No pain.    No other location of rash.    Triamcinolone use has 'may have changed the appearance. It is more raised maybe. Takes the edge of the itch off'.    Has hx of eczema on arm in 2016 on arms, not sure if different, but was on front of arm. Noted trigger back then by shampoo products.   Has 2 scaly spots on palms in last 2 weeks, not itchy.     2. R > L foot pain - notes has pain in 'ball of foot'.  Has some pain in great to third base of metatarsals.  Has not run in 2 months. Was running 20-30 miles/ week.  Is not better since stopped running.  Running is too painful, cannot run longer than one minute. Pain with walking down steps.  No swelling of joints.  No other swelling or painful joints except for soreness of R 4th digit pain. Mild swelling at that joint.     Review of Systems   Constitution: Negative for chills and fever.   Skin: Positive for rash and suspicious lesions (peeling areas on B palms. Some red papules on B great toes distall. ). Negative for nail changes (no pittintg).   Musculoskeletal: Positive for joint pain (focally on feet). Negative for joint swelling and stiffness.   Genitourinary: Negative for incomplete emptying.       The following portions of the patient's history were reviewed and updated as appropriate: allergies, current medications, past medical history, past social history and problem list.      Current Outpatient Medications:   •  Cholecalciferol (VITAMIN D-3) 1000 units capsule, Take  by " "mouth., Disp: , Rfl:   •  calcipotriene (DOVONEX) 0.005 % cream, Apply  topically to the appropriate area as directed 2 (Two) Times a Day., Disp: 60 g, Rfl: 1    Vitals:    12/11/20 1456   BP: 120/80   Pulse: 71   Temp: 98 °F (36.7 °C)   SpO2: 98%   Weight: 74.8 kg (165 lb)   Height: 177.8 cm (70\")     Body mass index is 23.68 kg/m².      Physical Exam  Vitals signs reviewed.   Constitutional:       General: He is not in acute distress.     Appearance: He is well-developed. He is not ill-appearing or toxic-appearing.   HENT:      Head: Normocephalic and atraumatic.      Mouth/Throat:      Mouth: No oral lesions.      Tongue: No lesions.      Pharynx: No pharyngeal swelling or uvula swelling.   Eyes:      General: No scleral icterus.     Conjunctiva/sclera: Conjunctivae normal.      Pupils: Pupils are equal, round, and reactive to light.   Neck:      Musculoskeletal: Normal range of motion and neck supple.   Cardiovascular:      Pulses:           Carotid pulses are 2+ on the right side and 2+ on the left side.       Radial pulses are 2+ on the right side and 2+ on the left side.        Dorsalis pedis pulses are 2+ on the right side.        Posterior tibial pulses are 2+ on the right side.   Pulmonary:      Effort: Pulmonary effort is normal. No respiratory distress.   Musculoskeletal:      Right hand: He exhibits normal range of motion, no tenderness and no bony tenderness.      Left hand: He exhibits normal range of motion, no tenderness and no bony tenderness.      Right lower leg: No edema.      Left lower leg: No edema.      Right foot: Normal range of motion. No deformity, bunion, Charcot foot or prominent metatarsal heads.   Feet:      Right foot:      Skin integrity: No ulcer, blister or skin breakdown.   Skin:     Findings: Rash (plaque like rash noted on B extensor surface of elbows with some fine scale noted at apex of elbows B. ) present.          Neurological:      Mental Status: He is alert and oriented " to person, place, and time.      Cranial Nerves: No cranial nerve deficit.      Gait: Gait normal.   Psychiatric:         Attention and Perception: Attention normal.         Mood and Affect: Mood and affect normal.         Behavior: Behavior normal.         Thought Content: Thought content normal.         Assessment/ Plan  Diagnoses and all orders for this visit:    Psoriasis  -     OC  -     CBC & Differential  -     Comprehensive Metabolic Panel  -     C-reactive Protein  -     Sedimentation Rate  -     calcipotriene (DOVONEX) 0.005 % cream; Apply  topically to the appropriate area as directed 2 (Two) Times a Day.    Foot pain, bilateral  -     OC  -     CBC & Differential  -     Comprehensive Metabolic Panel  -     C-reactive Protein  -     Sedimentation Rate        Return in about 5 months (around 5/11/2021) for Annual physical.      Discussion:  Eriberto Cleveland is a 40 y.o. male RTC In acute care (new issue to examiner) with several months of intensely itchy red, plaque like rash on B elbow extensor surfaces. Suspicious for psoriasis.  Pt has some concurrent foot pain that is more likely metatarsalgia by hx. Will send labs as noted above and consider foot imaging and podiatry eval if sed rate/ CRP OK.  Will trial calcipotrene cream over weekend along with topical steroids to see if can get more robust response supportive of psoriasis dx. WIll f/u with pt via phone next week after labs return.

## 2020-12-14 LAB
ALBUMIN SERPL-MCNC: 5 G/DL (ref 3.5–5.2)
ALBUMIN/GLOB SERPL: 2.4 G/DL
ALP SERPL-CCNC: 54 U/L (ref 39–117)
ALT SERPL-CCNC: 30 U/L (ref 1–41)
ANA SER QL: NEGATIVE
AST SERPL-CCNC: 17 U/L (ref 1–40)
BASOPHILS # BLD AUTO: 0.01 10*3/MM3 (ref 0–0.2)
BASOPHILS NFR BLD AUTO: 0.2 % (ref 0–1.5)
BILIRUB SERPL-MCNC: 0.6 MG/DL (ref 0–1.2)
BUN SERPL-MCNC: 17 MG/DL (ref 6–20)
BUN/CREAT SERPL: 20.7 (ref 7–25)
CALCIUM SERPL-MCNC: 9.3 MG/DL (ref 8.6–10.5)
CHLORIDE SERPL-SCNC: 99 MMOL/L (ref 98–107)
CO2 SERPL-SCNC: 30.3 MMOL/L (ref 22–29)
CREAT SERPL-MCNC: 0.82 MG/DL (ref 0.76–1.27)
CRP SERPL-MCNC: 0.04 MG/DL (ref 0–0.5)
EOSINOPHIL # BLD AUTO: 0.13 10*3/MM3 (ref 0–0.4)
EOSINOPHIL NFR BLD AUTO: 2 % (ref 0.3–6.2)
ERYTHROCYTE [DISTWIDTH] IN BLOOD BY AUTOMATED COUNT: 12.9 % (ref 12.3–15.4)
ERYTHROCYTE [SEDIMENTATION RATE] IN BLOOD BY WESTERGREN METHOD: 2 MM/HR (ref 0–15)
GLOBULIN SER CALC-MCNC: 2.1 GM/DL
GLUCOSE SERPL-MCNC: 93 MG/DL (ref 65–99)
HCT VFR BLD AUTO: 48.2 % (ref 37.5–51)
HGB BLD-MCNC: 16.2 G/DL (ref 13–17.7)
IMM GRANULOCYTES # BLD AUTO: 0.04 10*3/MM3 (ref 0–0.05)
IMM GRANULOCYTES NFR BLD AUTO: 0.6 % (ref 0–0.5)
LYMPHOCYTES # BLD AUTO: 0.99 10*3/MM3 (ref 0.7–3.1)
LYMPHOCYTES NFR BLD AUTO: 15.2 % (ref 19.6–45.3)
MCH RBC QN AUTO: 28.2 PG (ref 26.6–33)
MCHC RBC AUTO-ENTMCNC: 33.6 G/DL (ref 31.5–35.7)
MCV RBC AUTO: 83.8 FL (ref 79–97)
MONOCYTES # BLD AUTO: 0.72 10*3/MM3 (ref 0.1–0.9)
MONOCYTES NFR BLD AUTO: 11 % (ref 5–12)
NEUTROPHILS # BLD AUTO: 4.64 10*3/MM3 (ref 1.7–7)
NEUTROPHILS NFR BLD AUTO: 71 % (ref 42.7–76)
NRBC BLD AUTO-RTO: 0 /100 WBC (ref 0–0.2)
PLATELET # BLD AUTO: 301 10*3/MM3 (ref 140–450)
POTASSIUM SERPL-SCNC: 4.2 MMOL/L (ref 3.5–5.2)
PROT SERPL-MCNC: 7.1 G/DL (ref 6–8.5)
RBC # BLD AUTO: 5.75 10*6/MM3 (ref 4.14–5.8)
SODIUM SERPL-SCNC: 136 MMOL/L (ref 136–145)
WBC # BLD AUTO: 6.53 10*3/MM3 (ref 3.4–10.8)

## 2020-12-15 DIAGNOSIS — M77.41 METATARSALGIA OF BOTH FEET: Primary | ICD-10-CM

## 2020-12-15 DIAGNOSIS — M77.42 METATARSALGIA OF BOTH FEET: Primary | ICD-10-CM

## 2020-12-31 ENCOUNTER — OFFICE VISIT (OUTPATIENT)
Dept: INTERNAL MEDICINE | Facility: CLINIC | Age: 40
End: 2020-12-31

## 2020-12-31 VITALS
HEART RATE: 85 BPM | OXYGEN SATURATION: 98 % | DIASTOLIC BLOOD PRESSURE: 80 MMHG | WEIGHT: 173 LBS | HEIGHT: 70 IN | SYSTOLIC BLOOD PRESSURE: 118 MMHG | TEMPERATURE: 97.1 F | BODY MASS INDEX: 24.77 KG/M2

## 2020-12-31 DIAGNOSIS — L40.9 PSORIASIS: ICD-10-CM

## 2020-12-31 PROCEDURE — 99213 OFFICE O/P EST LOW 20 MIN: CPT | Performed by: INTERNAL MEDICINE

## 2020-12-31 RX ORDER — CALCIPOTRIENE 50 UG/G
CREAM TOPICAL 2 TIMES DAILY
Qty: 120 G | Refills: 2 | Status: SHIPPED | OUTPATIENT
Start: 2020-12-31 | End: 2021-04-29 | Stop reason: SDUPTHER

## 2020-12-31 NOTE — PROGRESS NOTES
"Rash (follow up)      HPI  Eriberto Cleveland is a 40 y.o. male RTC In f/u:   \"Here back from the rash\".  Has been using steroid and topical calcipotrene cream daily.  Took one day to see improvement with less itching.  It then peeled a lot for several days and now 'it seems to be healing'.  Still using both steroid and calcipotrene creams.  Wondered what needed to do at this point as has been getting better.  Has all been new issue for him and wonders what long term plan would be.   Has only areas on elbows that are healing and then one spot on R leg that is healing as well. Really not much left to see today.     Review of Systems   Constitution: Negative for chills and fever.   Skin: Negative for color change, itching, rash (resolved) and suspicious lesions.       The following portions of the patient's history were reviewed and updated as appropriate: allergies, current medications, past medical history, past social history and problem list.      Current Outpatient Medications:   •  calcipotriene (DOVONEX) 0.005 % cream, Apply  topically to the appropriate area as directed 2 (Two) Times a Day., Disp: 120 g, Rfl: 2  •  Cholecalciferol (VITAMIN D-3) 1000 units capsule, Take  by mouth., Disp: , Rfl:   •  triamcinolone (KENALOG) 0.1 % ointment, Apply  topically to the appropriate area as directed 2 (Two) Times a Day., Disp: 80 g, Rfl: 1    Vitals:    12/31/20 0831   BP: 118/80   Pulse: 85   Temp: 97.1 °F (36.2 °C)   SpO2: 98%   Weight: 78.5 kg (173 lb)   Height: 177.8 cm (70\")     Body mass index is 24.82 kg/m².      Physical Exam  Vitals signs reviewed.   Constitutional:       General: He is not in acute distress.     Appearance: He is well-developed. He is not ill-appearing or toxic-appearing.   HENT:      Head: Normocephalic.   Pulmonary:      Effort: Pulmonary effort is normal. No respiratory distress.   Musculoskeletal:      Right lower leg: No edema.   Skin:     Findings: No bruising, erythema, rash or wound.        "   Neurological:      Mental Status: He is alert and oriented to person, place, and time.      Gait: Gait normal.   Psychiatric:         Behavior: Behavior normal.         Thought Content: Thought content normal.         Assessment/ Plan  Diagnoses and all orders for this visit:    Psoriasis  -     calcipotriene (DOVONEX) 0.005 % cream; Apply  topically to the appropriate area as directed 2 (Two) Times a Day.        No follow-ups on file.      Discussion:  Eriberto Cleveland is a 40 y.o. male RTC In short interval f/u after recent presumptive dx of psoriasis, limited disease to B elbow extensor surfaces and single R lower leg spot, responding well to topical steroid and calcipotrene cream daily.  Near full resolution of B extensor elbow rash on exam today with only minimal area B of circumferential desquamation noted inferior to olecranon.  Will complete this week of dual tx and then drop back to calcipotrene only with daily Aveeno or Eucerin emollient cream after shower.  OK to trial off calcipotrene in 6 weeks or so if has full resolution, but may need derm eval or longer term calcipotrene if recurrence.  Pt to f/u PRN on issue and at regular scheduled visits.

## 2021-02-23 ENCOUNTER — LAB (OUTPATIENT)
Dept: LAB | Facility: HOSPITAL | Age: 41
End: 2021-02-23

## 2021-02-23 ENCOUNTER — OFFICE VISIT (OUTPATIENT)
Dept: INTERNAL MEDICINE | Facility: CLINIC | Age: 41
End: 2021-02-23

## 2021-02-23 VITALS
BODY MASS INDEX: 23.19 KG/M2 | HEART RATE: 92 BPM | SYSTOLIC BLOOD PRESSURE: 130 MMHG | OXYGEN SATURATION: 98 % | TEMPERATURE: 97.1 F | HEIGHT: 70 IN | DIASTOLIC BLOOD PRESSURE: 70 MMHG | WEIGHT: 162 LBS

## 2021-02-23 DIAGNOSIS — J02.9 SORE THROAT: Primary | ICD-10-CM

## 2021-02-23 DIAGNOSIS — Z20.2 HPV EXPOSURE: ICD-10-CM

## 2021-02-23 DIAGNOSIS — Z87.09 HISTORY OF SORE THROAT: ICD-10-CM

## 2021-02-23 LAB
B PARAPERT DNA SPEC QL NAA+PROBE: NOT DETECTED
B PERT DNA SPEC QL NAA+PROBE: NOT DETECTED
BASOPHILS # BLD AUTO: 0.01 10*3/MM3 (ref 0–0.2)
BASOPHILS NFR BLD AUTO: 0.2 % (ref 0–1.5)
C PNEUM DNA NPH QL NAA+NON-PROBE: NOT DETECTED
DEPRECATED RDW RBC AUTO: 39 FL (ref 37–54)
EOSINOPHIL # BLD AUTO: 0.05 10*3/MM3 (ref 0–0.4)
EOSINOPHIL NFR BLD AUTO: 0.8 % (ref 0.3–6.2)
ERYTHROCYTE [DISTWIDTH] IN BLOOD BY AUTOMATED COUNT: 13.4 % (ref 12.3–15.4)
FLUAV SUBTYP SPEC NAA+PROBE: NOT DETECTED
FLUBV RNA ISLT QL NAA+PROBE: NOT DETECTED
HADV DNA SPEC NAA+PROBE: NOT DETECTED
HCOV 229E RNA SPEC QL NAA+PROBE: NOT DETECTED
HCOV HKU1 RNA SPEC QL NAA+PROBE: NOT DETECTED
HCOV NL63 RNA SPEC QL NAA+PROBE: NOT DETECTED
HCOV OC43 RNA SPEC QL NAA+PROBE: NOT DETECTED
HCT VFR BLD AUTO: 46.3 % (ref 37.5–51)
HGB BLD-MCNC: 15.8 G/DL (ref 13–17.7)
HMPV RNA NPH QL NAA+NON-PROBE: NOT DETECTED
HPIV1 RNA SPEC QL NAA+PROBE: NOT DETECTED
HPIV2 RNA SPEC QL NAA+PROBE: NOT DETECTED
HPIV3 RNA NPH QL NAA+PROBE: NOT DETECTED
HPIV4 P GENE NPH QL NAA+PROBE: NOT DETECTED
IMM GRANULOCYTES # BLD AUTO: 0.02 10*3/MM3 (ref 0–0.05)
IMM GRANULOCYTES NFR BLD AUTO: 0.3 % (ref 0–0.5)
LYMPHOCYTES # BLD AUTO: 0.94 10*3/MM3 (ref 0.7–3.1)
LYMPHOCYTES NFR BLD AUTO: 14.3 % (ref 19.6–45.3)
M PNEUMO IGG SER IA-ACNC: NOT DETECTED
MCH RBC QN AUTO: 28 PG (ref 26.6–33)
MCHC RBC AUTO-ENTMCNC: 34.1 G/DL (ref 31.5–35.7)
MCV RBC AUTO: 81.9 FL (ref 79–97)
MONOCYTES # BLD AUTO: 0.71 10*3/MM3 (ref 0.1–0.9)
MONOCYTES NFR BLD AUTO: 10.8 % (ref 5–12)
NEUTROPHILS NFR BLD AUTO: 4.86 10*3/MM3 (ref 1.7–7)
NEUTROPHILS NFR BLD AUTO: 73.6 % (ref 42.7–76)
NRBC BLD AUTO-RTO: 0 /100 WBC (ref 0–0.2)
PLATELET # BLD AUTO: 285 10*3/MM3 (ref 140–450)
PMV BLD AUTO: 8.8 FL (ref 6–12)
RBC # BLD AUTO: 5.65 10*6/MM3 (ref 4.14–5.8)
RHINOVIRUS RNA SPEC NAA+PROBE: NOT DETECTED
RSV RNA NPH QL NAA+NON-PROBE: NOT DETECTED
WBC # BLD AUTO: 6.59 10*3/MM3 (ref 3.4–10.8)

## 2021-02-23 PROCEDURE — 0100U HC BIOFIRE FILMARRAY RESP PANEL 2: CPT | Performed by: INTERNAL MEDICINE

## 2021-02-23 PROCEDURE — 99214 OFFICE O/P EST MOD 30 MIN: CPT | Performed by: INTERNAL MEDICINE

## 2021-02-23 PROCEDURE — 85025 COMPLETE CBC W/AUTO DIFF WBC: CPT | Performed by: INTERNAL MEDICINE

## 2021-02-23 PROCEDURE — 87081 CULTURE SCREEN ONLY: CPT | Performed by: INTERNAL MEDICINE

## 2021-02-23 PROCEDURE — 36415 COLL VENOUS BLD VENIPUNCTURE: CPT | Performed by: INTERNAL MEDICINE

## 2021-02-23 NOTE — PROGRESS NOTES
"Sore Throat (2 weeks )      HPI  Eriberto Cleveland is a 40 y.o. male RTC in acute care:  \"It is just a sore throat\".   Really no other associated sx.  Notes started a few weeks ago and has waxed and waned. Got worse in last few days and has pain difficult to swallow now.  Pain is really profound and cant get it out of head. In last few days has been tender to touch externally.  No cough. No voice changes.  No fevers.  Anything that swallows makes worse.  No idea of anything that makes it worse. No nodes noted.     Here last year for similar pain issue in 3/2020.  Pain resolved in a few weeks at the time.  Recalls had severe pain at the time.     Is really anxious as wife was just diagnosed with cervical cancer with HPV and is planned for 'radical hysterectomy' in a few weeks.    Really is anxious about the issue and has lost sleep over issue.     No sick contacts. No COVID contacts recently.   Sense of taste and smell is OK.     Review of Systems   Constitution: Negative for chills and fever.   HENT: Positive for odynophagia and sore throat. Negative for congestion, ear discharge, ear pain, hoarse voice and stridor.    Cardiovascular: Negative for dyspnea on exertion.   Respiratory: Negative for cough.    Skin: Positive for rash (on dorsal L hand, laterally.  Not responding as much to calcipotrene. \"Not too bad today'. ).   Musculoskeletal: Positive for neck pain (anterior only).   Gastrointestinal: Negative for abdominal pain, diarrhea and vomiting.   Neurological: Negative for headaches, numbness and paresthesias.       The following portions of the patient's history were reviewed and updated as appropriate: allergies, current medications, past medical history, past social history and problem list.      Current Outpatient Medications:   •  calcipotriene (DOVONEX) 0.005 % cream, Apply  topically to the appropriate area as directed 2 (Two) Times a Day., Disp: 120 g, Rfl: 2  •  Cholecalciferol (VITAMIN D-3) 1000 units " "capsule, Take  by mouth., Disp: , Rfl:   •  triamcinolone (KENALOG) 0.1 % ointment, Apply  topically to the appropriate area as directed 2 (Two) Times a Day., Disp: 80 g, Rfl: 1    Vitals:    02/23/21 1607   BP: 130/70   Pulse: 92   Temp: 97.1 °F (36.2 °C)   SpO2: 98%   Weight: 73.5 kg (162 lb)   Height: 177.8 cm (70\")     Body mass index is 23.24 kg/m².      Physical Exam  Constitutional:       General: He is not in acute distress.     Appearance: Normal appearance. He is not ill-appearing or toxic-appearing.   HENT:      Head: Normocephalic and atraumatic.      Right Ear: Tympanic membrane, ear canal and external ear normal.      Left Ear: Tympanic membrane, ear canal and external ear normal.      Nose: No mucosal edema, congestion or rhinorrhea.      Mouth/Throat:      Mouth: Mucous membranes are moist. No injury, lacerations or oral lesions.      Tongue: No lesions.      Palate: No mass and lesions.      Pharynx: Oropharynx is clear. No pharyngeal swelling, oropharyngeal exudate, posterior oropharyngeal erythema or uvula swelling.      Tonsils: No tonsillar exudate or tonsillar abscesses.   Eyes:      General: No scleral icterus.     Conjunctiva/sclera: Conjunctivae normal.      Pupils: Pupils are equal, round, and reactive to light.   Neck:      Musculoskeletal: Normal range of motion and neck supple. Muscular tenderness (mild L neck, anterior.  ) present. No neck rigidity.   Cardiovascular:      Rate and Rhythm: Normal rate and regular rhythm.      Heart sounds: Normal heart sounds.   Pulmonary:      Effort: Pulmonary effort is normal. No respiratory distress.      Breath sounds: Normal breath sounds. No wheezing, rhonchi or rales.   Lymphadenopathy:      Head:      Right side of head: No submental, submandibular, tonsillar, preauricular, posterior auricular or occipital adenopathy.      Left side of head: No submental, submandibular, tonsillar, preauricular, posterior auricular or occipital adenopathy.      " Cervical: No cervical adenopathy.      Right cervical: No superficial, deep or posterior cervical adenopathy.     Left cervical: No superficial, deep or posterior cervical adenopathy.      Upper Body:      Right upper body: No supraclavicular adenopathy.      Left upper body: No supraclavicular adenopathy.   Skin:     Findings: Rash (Very mild erythema area over dorsal L hand, very faint scale.  Indistint borders. ) present.          Neurological:      Mental Status: He is alert and oriented to person, place, and time.      Cranial Nerves: No cranial nerve deficit.      Gait: Gait normal.   Psychiatric:         Mood and Affect: Mood normal.         Behavior: Behavior normal.         Thought Content: Thought content normal.         Assessment/ Plan  Diagnoses and all orders for this visit:    Sore throat  -     CBC & Differential  -     Throat / Upper Respiratory Culture - Swab, Throat  -     Respiratory Panel, PCR (WITHOUT COVID) - Swab, Nasopharynx  -     Ambulatory Referral to ENT (Otolaryngology)    History of sore throat  -     CBC & Differential  -     Throat / Upper Respiratory Culture - Swab, Throat  -     Respiratory Panel, PCR (WITHOUT COVID) - Swab, Nasopharynx  -     Ambulatory Referral to ENT (Otolaryngology)    HPV exposure  -     CBC & Differential  -     Throat / Upper Respiratory Culture - Swab, Throat  -     Respiratory Panel, PCR (WITHOUT COVID) - Swab, Nasopharynx  -     Ambulatory Referral to ENT (Otolaryngology)        Return for Next scheduled follow up.      Discussion:  Eriberto Cleveland is a 40 y.o. male RTC in acute care (new issue to examiner) with recurrent issue of sore throat, severe. Pt noted similar event in 3/2020, resolved. Recurred in last 2 weeks with same level of severity and is 'intolerable'.   Exam today is benign. Pt worried well as wife just dx'd with HPV related cervical cancer and pt concerned about his risk for head and neck cancer.  Reassured with exam today. Will have pt  complete throat cx and respiratory panel along with CBC in labs today. However, will ask ENT to see pt ASAP as pt has extreme anxiety over this and would benefit from consideration of flex scope to directly visualize laryngeal structures with ENT. Referral placed.   Psoriasis - controlled on extensor surfaces on calcipotrene. Mild red area on L dorsal hand, hard to assess etiology today. OK for steroid cream x 2 weeks to area on hand, then back to calcipotrene.         Answers for HPI/ROS submitted by the patient on 2/23/2021   Sore throat  What is the primary reason for your visit?: Sore Throat  Onset: 1 to 4 weeks ago  Progression since onset: waxing and waning  Pain worse on: left  Fever: no fever  Pain - numeric: 9/10  drooling: No  plugged ear sensation: No  trouble swallowing: Yes

## 2021-02-25 LAB — BACTERIA SPEC AEROBE CULT: NORMAL

## 2021-03-25 ENCOUNTER — PATIENT MESSAGE (OUTPATIENT)
Dept: INTERNAL MEDICINE | Facility: CLINIC | Age: 41
End: 2021-03-25

## 2021-03-25 DIAGNOSIS — M79.671 BILATERAL FOOT PAIN: Primary | ICD-10-CM

## 2021-03-25 DIAGNOSIS — L40.9 PSORIASIS: ICD-10-CM

## 2021-03-25 DIAGNOSIS — M79.672 BILATERAL FOOT PAIN: Primary | ICD-10-CM

## 2021-03-26 DIAGNOSIS — M79.672 BILATERAL FOOT PAIN: Primary | ICD-10-CM

## 2021-03-26 DIAGNOSIS — M77.41 METATARSALGIA OF BOTH FEET: ICD-10-CM

## 2021-03-26 DIAGNOSIS — M77.42 METATARSALGIA OF BOTH FEET: ICD-10-CM

## 2021-03-26 DIAGNOSIS — M79.671 BILATERAL FOOT PAIN: Primary | ICD-10-CM

## 2021-04-15 ENCOUNTER — PATIENT MESSAGE (OUTPATIENT)
Dept: INTERNAL MEDICINE | Facility: CLINIC | Age: 41
End: 2021-04-15

## 2021-04-15 PROBLEM — L40.9 PSORIASIS: Status: ACTIVE | Noted: 2021-04-15

## 2021-04-15 NOTE — TELEPHONE ENCOUNTER
I reviewed case with rheum and will ask them to eval for psoriatic arthritis given foot pain and dx of psoriasis. I called pt to review plan 4/15/21 at ~4:43pm.  [facility].

## 2021-04-16 NOTE — TELEPHONE ENCOUNTER
From: Eriberto Cleveland  To: Dada Hernandez MD  Sent: 4/15/2021 4:48 PM EDT  Subject: Non-Urgent Medical Question    Dr Hernandez,    I just missed your call about the rheumatologist you spoke with regarding my foot pain. Please call back at your earliest convenience. [phone]    Thank you!!    [place]andon

## 2021-04-16 NOTE — TELEPHONE ENCOUNTER
I called and d/w pt my d/w rheumatology. Concern there for psoriatic arthritis as etiology of foot pain.  Pt agrees to referral (placed on day of call).  Pt has foot and ankle ortho appt upcoming and will keep that appt and part of r/o process and Xrays.  Will await note.

## 2021-04-27 DIAGNOSIS — Z11.59 NEED FOR HEPATITIS C SCREENING TEST: ICD-10-CM

## 2021-04-27 DIAGNOSIS — E78.5 DYSLIPIDEMIA: ICD-10-CM

## 2021-04-27 DIAGNOSIS — Z00.00 HEALTHCARE MAINTENANCE: Primary | ICD-10-CM

## 2021-04-27 DIAGNOSIS — E55.9 AVITAMINOSIS D: ICD-10-CM

## 2021-04-29 DIAGNOSIS — L40.9 PSORIASIS: ICD-10-CM

## 2021-04-29 RX ORDER — CALCIPOTRIENE 50 UG/G
CREAM TOPICAL 2 TIMES DAILY
Qty: 120 G | Refills: 2 | Status: SHIPPED | OUTPATIENT
Start: 2021-04-29 | End: 2021-07-28 | Stop reason: SDUPTHER

## 2021-05-06 LAB
25(OH)D3+25(OH)D2 SERPL-MCNC: 34.7 NG/ML
ALBUMIN SERPL-MCNC: 4.8 G/DL (ref 3.5–5.2)
ALBUMIN/GLOB SERPL: 2.4 G/DL
ALP SERPL-CCNC: 57 U/L (ref 39–117)
ALT SERPL-CCNC: 28 U/L (ref 1–41)
APPEARANCE UR: CLEAR
AST SERPL-CCNC: 14 U/L (ref 1–40)
BACTERIA #/AREA URNS HPF: NORMAL /HPF
BASOPHILS # BLD AUTO: 0.01 10*3/MM3 (ref 0–0.2)
BASOPHILS NFR BLD AUTO: 0.2 % (ref 0–1.5)
BILIRUB SERPL-MCNC: 0.6 MG/DL (ref 0–1.2)
BILIRUB UR QL STRIP: NEGATIVE
BUN SERPL-MCNC: 19 MG/DL (ref 6–20)
BUN/CREAT SERPL: 22.1 (ref 7–25)
CALCIUM SERPL-MCNC: 9.3 MG/DL (ref 8.6–10.5)
CASTS URNS QL MICRO: NORMAL /LPF
CHLORIDE SERPL-SCNC: 104 MMOL/L (ref 98–107)
CHOLEST SERPL-MCNC: 215 MG/DL (ref 0–200)
CO2 SERPL-SCNC: 27.5 MMOL/L (ref 22–29)
COLOR UR: YELLOW
CREAT SERPL-MCNC: 0.86 MG/DL (ref 0.76–1.27)
EOSINOPHIL # BLD AUTO: 0.06 10*3/MM3 (ref 0–0.4)
EOSINOPHIL NFR BLD AUTO: 0.9 % (ref 0.3–6.2)
EPI CELLS #/AREA URNS HPF: NORMAL /HPF (ref 0–10)
ERYTHROCYTE [DISTWIDTH] IN BLOOD BY AUTOMATED COUNT: 13.5 % (ref 12.3–15.4)
GLOBULIN SER CALC-MCNC: 2 GM/DL
GLUCOSE SERPL-MCNC: 87 MG/DL (ref 65–99)
GLUCOSE UR QL: NEGATIVE
HCT VFR BLD AUTO: 46.5 % (ref 37.5–51)
HCV AB S/CO SERPL IA: <0.1 S/CO RATIO (ref 0–0.9)
HDLC SERPL-MCNC: 50 MG/DL (ref 40–60)
HGB BLD-MCNC: 15.9 G/DL (ref 13–17.7)
HGB UR QL STRIP: NEGATIVE
IMM GRANULOCYTES # BLD AUTO: 0.06 10*3/MM3 (ref 0–0.05)
IMM GRANULOCYTES NFR BLD AUTO: 0.9 % (ref 0–0.5)
KETONES UR QL STRIP: ABNORMAL
LDLC SERPL CALC-MCNC: 141 MG/DL (ref 0–100)
LEUKOCYTE ESTERASE UR QL STRIP: NEGATIVE
LYMPHOCYTES # BLD AUTO: 0.77 10*3/MM3 (ref 0.7–3.1)
LYMPHOCYTES NFR BLD AUTO: 12 % (ref 19.6–45.3)
MCH RBC QN AUTO: 28.3 PG (ref 26.6–33)
MCHC RBC AUTO-ENTMCNC: 34.2 G/DL (ref 31.5–35.7)
MCV RBC AUTO: 82.9 FL (ref 79–97)
MICRO URNS: ABNORMAL
MONOCYTES # BLD AUTO: 0.65 10*3/MM3 (ref 0.1–0.9)
MONOCYTES NFR BLD AUTO: 10.1 % (ref 5–12)
NEUTROPHILS # BLD AUTO: 4.87 10*3/MM3 (ref 1.7–7)
NEUTROPHILS NFR BLD AUTO: 75.9 % (ref 42.7–76)
NITRITE UR QL STRIP: NEGATIVE
NRBC BLD AUTO-RTO: 0 /100 WBC (ref 0–0.2)
PH UR STRIP: 7 [PH] (ref 5–7.5)
PLATELET # BLD AUTO: 323 10*3/MM3 (ref 140–450)
POTASSIUM SERPL-SCNC: 4.6 MMOL/L (ref 3.5–5.2)
PROT SERPL-MCNC: 6.8 G/DL (ref 6–8.5)
PROT UR QL STRIP: ABNORMAL
RBC # BLD AUTO: 5.61 10*6/MM3 (ref 4.14–5.8)
RBC #/AREA URNS HPF: NORMAL /HPF (ref 0–2)
SODIUM SERPL-SCNC: 141 MMOL/L (ref 136–145)
SP GR UR: >=1.03 (ref 1–1.03)
TRIGL SERPL-MCNC: 136 MG/DL (ref 0–150)
TSH SERPL DL<=0.005 MIU/L-ACNC: 1.84 UIU/ML (ref 0.27–4.2)
URINALYSIS REFLEX: ABNORMAL
UROBILINOGEN UR STRIP-MCNC: 0.2 MG/DL (ref 0.2–1)
VLDLC SERPL CALC-MCNC: 24 MG/DL (ref 5–40)
WBC # BLD AUTO: 6.42 10*3/MM3 (ref 3.4–10.8)
WBC #/AREA URNS HPF: NORMAL /HPF (ref 0–5)

## 2021-05-11 ENCOUNTER — OFFICE VISIT (OUTPATIENT)
Dept: INTERNAL MEDICINE | Facility: CLINIC | Age: 41
End: 2021-05-11

## 2021-05-11 VITALS
OXYGEN SATURATION: 99 % | SYSTOLIC BLOOD PRESSURE: 118 MMHG | HEIGHT: 70 IN | DIASTOLIC BLOOD PRESSURE: 70 MMHG | WEIGHT: 167 LBS | HEART RATE: 79 BPM | BODY MASS INDEX: 23.91 KG/M2 | RESPIRATION RATE: 12 BRPM | TEMPERATURE: 97.1 F

## 2021-05-11 DIAGNOSIS — Z00.00 HEALTHCARE MAINTENANCE: Primary | ICD-10-CM

## 2021-05-11 DIAGNOSIS — L40.9 PSORIASIS: ICD-10-CM

## 2021-05-11 DIAGNOSIS — D18.09 HEMANGIOMA OF SPINE: ICD-10-CM

## 2021-05-11 DIAGNOSIS — E78.5 DYSLIPIDEMIA: ICD-10-CM

## 2021-05-11 DIAGNOSIS — E55.9 AVITAMINOSIS D: ICD-10-CM

## 2021-05-11 DIAGNOSIS — N42.89 ASYMMETRIC PROSTATE: ICD-10-CM

## 2021-05-11 DIAGNOSIS — M77.42 METATARSALGIA OF BOTH FEET: ICD-10-CM

## 2021-05-11 DIAGNOSIS — M77.41 METATARSALGIA OF BOTH FEET: ICD-10-CM

## 2021-05-11 DIAGNOSIS — R80.0 ISOLATED PROTEINURIA WITHOUT SPECIFIC MORPHOLOGIC LESION: ICD-10-CM

## 2021-05-11 PROBLEM — R20.0 NUMBNESS OF RIGHT ANTERIOR THIGH: Status: RESOLVED | Noted: 2018-11-15 | Resolved: 2021-05-11

## 2021-05-11 PROBLEM — M54.16 LUMBAR RADICULOPATHY: Status: RESOLVED | Noted: 2019-10-25 | Resolved: 2021-05-11

## 2021-05-11 PROCEDURE — 90471 IMMUNIZATION ADMIN: CPT | Performed by: INTERNAL MEDICINE

## 2021-05-11 PROCEDURE — 99396 PREV VISIT EST AGE 40-64: CPT | Performed by: INTERNAL MEDICINE

## 2021-05-11 PROCEDURE — 90632 HEPA VACCINE ADULT IM: CPT | Performed by: INTERNAL MEDICINE

## 2021-05-11 RX ORDER — NAPROXEN SODIUM 220 MG
220 TABLET ORAL 2 TIMES DAILY PRN
COMMUNITY
End: 2022-11-09

## 2021-05-11 NOTE — PROGRESS NOTES
"Annual Exam (review of medical issues )      HPI  Eriberto Cleveland is a 41 y.o. male RTC In yearly CPE, review of medical issues:  1. Recurrent issue of sore throat, severe - 'it is fine'.  Had throat scope at ENT and told 'either stress or acid reflux'.  Took OTC PPI and 'it got better'. Not sure if med helped or just got better on own. No typical heartburn sx and no sx prior in life of heartburn. \"My gut is that it was stress\".  Told scope looked OK, 'all negative'. Saw [facility], Dr. Telles.   2. Psoriasis, limited disease to B elbow extensor surfaces and single R lower leg spot - 'it is fine'.  Stays on calcipotrene on hand and B elbow sdaily and keeps controlled.  No steroid cream in months.   3. Bilateral foot pain - thought metatarsalgia at last visit. Did see podiatry over year and was in splints that did not help.  Did see ortho foot and ankle, Dr. Simon, and got MRI of feet at ortho.  \"did not show anything bad\".  Told 'definitely inflammation there in 2nd metatarsal joint and has early hammertoe.  Had injx last week at B2nd metatarsal joint. \"It feels better\".   Is not sure if temporary. Will be back in there to see him in a few weeks.  Sees rheum at end of the month.   that is more likely metatarsalgia by hx. Will send labs as noted above and consider foot imaging and podiatry eval if sed rate/ CRP OK.   4. Vitamin D deficiency - on 1000 I.U. Vitamin D3 OTC daily.   5. HM - Hep A #2 not completed in past; Had COVID shots completed.     Review of Systems   Constitutional: Positive for malaise/fatigue (will note more in last year off running). Negative for chills, fever, weight gain and weight loss.   HENT: Negative for congestion, hearing loss, odynophagia and sore throat (resolved).    Eyes: Negative for discharge, double vision, pain and redness.        Last eye exam ~12/2019; wears contacts     Cardiovascular: Negative for chest pain, dyspnea on exertion, irregular heartbeat, near-syncope, " palpitations and syncope.   Respiratory: Negative for cough, shortness of breath, sleep disturbances due to breathing and snoring.    Endocrine: Negative for polydipsia, polyphagia and polyuria.   Hematologic/Lymphatic: Negative for bleeding problem. Does not bruise/bleed easily.   Skin: Negative for rash (psoriasis controlled) and suspicious lesions.   Musculoskeletal: Positive for joint pain (B feet at MTP joints). Negative for joint swelling, muscle cramps, muscle weakness and myalgias.   Gastrointestinal: Negative for constipation, diarrhea, dysphagia, heartburn, nausea and vomiting.   Genitourinary: Negative for dysuria, frequency, hematuria, hesitancy and incomplete emptying.   Neurological: Negative for excessive daytime sleepiness, dizziness, headaches and light-headedness.   Psychiatric/Behavioral: Negative for depression. The patient does not have insomnia and is not nervous/anxious.         Mood is holding, feels like being off running is the biggest issue for him.   Denies depression.    Allergic/Immunologic: Negative for environmental allergies and persistent infections.       Problem List:    Patient Active Problem List   Diagnosis   • Dyslipidemia   • Asymmetric prostate   • Avitaminosis D   • DDD (degenerative disc disease), lumbar   • Hemangioma of spine   • Psoriasis   • Metatarsalgia of both feet       Medical History:    Past Medical History:   Diagnosis Date   • Asymmetric prostate    • Dermatitis, eczematoid 12/27/2016    Description: R >> L arm, recurrent   • Dyslipidemia     low hdl diet/exericse mgmnt rec'd   • Eczematous dermatitis     R>>L arm recurrent   • History of viral meningitis     2012   • Lumbar radiculopathy 10/25/2019   • Numbness of right anterior thigh 11/15/2018   • Prostate nodule    • Vitamin D deficiency         Social History:    Social History     Socioeconomic History   • Marital status:      Spouse name: Not on file   • Number of children: 2   • Years of  education: Not on file   • Highest education level: Not on file   Tobacco Use   • Smoking status: Former Smoker   • Smokeless tobacco: Never Used   • Tobacco comment: 8 pk/yr hx quit at age 25   Vaping Use   • Vaping Use: Never used   Substance and Sexual Activity   • Alcohol use: Yes     Comment: 4-6 drinks month; none since 2020   • Drug use: No   • Sexual activity: Yes     Partners: Female     Comment: wife only; no hx STD's       Family History:   Family History   Problem Relation Age of Onset   • Hyperlipidemia Father    • Hypertension Father    • Hypertension Brother    • Crohn's disease Brother    • Heart attack Paternal Grandfather    • No Known Problems Son        Surgical History: History reviewed. No pertinent surgical history.      Current Outpatient Medications:   •  calcipotriene (DOVONEX) 0.005 % cream, Apply  topically to the appropriate area as directed 2 (Two) Times a Day., Disp: 120 g, Rfl: 2  •  Cholecalciferol (VITAMIN D-3) 1000 units capsule, Take  by mouth., Disp: , Rfl:   •  naproxen sodium (ALEVE) 220 MG tablet, Take 220 mg by mouth 2 (Two) Times a Day As Needed., Disp: , Rfl:   •  triamcinolone (KENALOG) 0.1 % ointment, Apply  topically to the appropriate area as directed 2 (Two) Times a Day., Disp: 80 g, Rfl: 1    Vitals:    05/11/21 1008   BP: 118/70   Pulse: 79   Resp: 12   Temp: 97.1 °F (36.2 °C)   SpO2: 99%     Body mass index is 23.96 kg/m².    Physical Exam  Vitals reviewed.   Constitutional:       General: He is not in acute distress.     Appearance: Normal appearance. He is well-developed. He is not ill-appearing or toxic-appearing.   HENT:      Head: Normocephalic and atraumatic.      Right Ear: Hearing, tympanic membrane, ear canal and external ear normal. There is no impacted cerumen.      Left Ear: Hearing, tympanic membrane, ear canal and external ear normal. There is no impacted cerumen.      Nose: Nose normal.      Mouth/Throat:      Mouth: Mucous membranes are moist. No  oral lesions.      Tongue: No lesions.      Pharynx: Oropharynx is clear. Uvula midline. No pharyngeal swelling, oropharyngeal exudate, posterior oropharyngeal erythema or uvula swelling.   Eyes:      General: Lids are normal. No scleral icterus.        Right eye: No discharge.         Left eye: No discharge.      Extraocular Movements: Extraocular movements intact.      Conjunctiva/sclera: Conjunctivae normal.      Pupils: Pupils are equal, round, and reactive to light.   Neck:      Thyroid: No thyroid mass or thyromegaly.      Vascular: No carotid bruit.   Cardiovascular:      Rate and Rhythm: Normal rate and regular rhythm.      Pulses:           Radial pulses are 2+ on the right side and 2+ on the left side.        Dorsalis pedis pulses are 2+ on the right side and 2+ on the left side.        Posterior tibial pulses are 2+ on the right side and 2+ on the left side.      Heart sounds: Normal heart sounds, S1 normal and S2 normal. No murmur heard.   No friction rub. No gallop.    Pulmonary:      Effort: Pulmonary effort is normal. No respiratory distress.      Breath sounds: Normal breath sounds. No wheezing, rhonchi or rales.   Abdominal:      General: Bowel sounds are normal. There is no distension.      Palpations: Abdomen is soft. There is no mass.      Tenderness: There is no abdominal tenderness. There is no guarding or rebound.   Genitourinary:     Prostate: Normal. Not enlarged (slight asymmetry noted ) and not tender.      Rectum: Normal. No external hemorrhoid. Normal anal tone.   Musculoskeletal:         General: No deformity. Normal range of motion.      Cervical back: Full passive range of motion without pain, normal range of motion and neck supple.      Right lower leg: No edema.      Left lower leg: No edema.      Right foot: Normal range of motion. No deformity or bunion.      Left foot: Normal range of motion. No deformity or bunion.   Feet:      Comments: Minimal TTP over B 2nd MTP  joints    Lymphadenopathy:      Cervical: No cervical adenopathy.      Right cervical: No superficial, deep or posterior cervical adenopathy.     Left cervical: No superficial, deep or posterior cervical adenopathy.      Upper Body:      Right upper body: No supraclavicular, axillary or pectoral adenopathy.      Left upper body: No supraclavicular, axillary or pectoral adenopathy.   Skin:     General: Skin is warm and dry.      Findings: No rash.      Comments: No active psoriatic plaques noted     Neurological:      Mental Status: He is alert and oriented to person, place, and time.      Cranial Nerves: No cranial nerve deficit.      Sensory: No sensory deficit.      Motor: No weakness, tremor, atrophy or abnormal muscle tone.      Gait: Gait normal.      Deep Tendon Reflexes: Reflexes are normal and symmetric.      Reflex Scores:       Patellar reflexes are 2+ on the right side and 2+ on the left side.       Achilles reflexes are 2+ on the right side and 2+ on the left side.  Psychiatric:         Attention and Perception: Attention normal.         Mood and Affect: Mood normal.         Speech: Speech normal.         Behavior: Behavior normal. Behavior is cooperative.         Thought Content: Thought content normal.         Assessment/ Plan  Diagnoses and all orders for this visit:    Healthcare maintenance  -     Hepatitis A Vaccine Adult IM    Psoriasis    Dyslipidemia    Avitaminosis D    Asymmetric prostate    Metatarsalgia of both feet    Hemangioma of spine    Isolated proteinuria without specific morphologic lesion  -     UA / M With / Rflx Culture([facility] ONLY) - Urine, Clean Catch  -     Protein / Creatinine Ratio, Urine - Urine, Clean Catch    Other orders  -     naproxen sodium (ALEVE) 220 MG tablet; Take 220 mg by mouth 2 (Two) Times a Day As Needed.        Return in about 1 year (around 5/11/2022) for Annual physical.      Discussion:  Eriberto Cleveland is a 41 y.o. male RTC In yearly CPE, review of medical  issues:  1. Recurrent sore throat, severe;  similar event in 3/2020 - infectious eval in 2/2021 (-).  Saw ENT and had laryngoscopy that was reported negative.  Resolved with PPI use, now off. Reviewed reflux presentation with pt. Monitor for now.  Will obtain note from Dr. Telles for review.   2. Psoriasis, limited disease to B elbow extensor surfaces and single R lower leg spot - controlled on calcipotrene only, off steroid cream.    3. Bilateral foot pain, focal symmetric 2nd MTP pain s/p MRI and injx with ortho -  Recall failed splinting prior with podiatry.  Has rheum eval upcoming as still concern for psoriatic arthritis presentation.  F/U ortho planned.  Asx'ic today.  Will obtain note and MRI for review.   4. L spine hemangioma - stable on 4/2019 imaging, no asddition eval needed.   5. Vitamin D deficiency - replete on 1000 I.U. Vitamin D3 OTC daily. C/W same.   6. Prostate Asymmetry on R - s/p urology eval 4/ 2016 and 6/2017.  Minimal on exam today.  Monitor.   7. Dyslipidemia, with hx low HDL - HDL better over time.  Add back exercise as able. No meds advised today.   8. HM - labs d/w pt; Flu - next season; Tdap/ COVID -UTD; Hep A #2 today; [facility] OK; Hep C Ab (-) 5/2021; add back exercise; recheck U/A today for protein noted in concentrated specimen.     RTC one year CPE, F labs prior

## 2021-05-12 LAB
APPEARANCE UR: CLEAR
BACTERIA #/AREA URNS HPF: NORMAL /HPF
BILIRUB UR QL STRIP: NEGATIVE
CASTS URNS QL MICRO: NORMAL /LPF
COLOR UR: YELLOW
EPI CELLS #/AREA URNS HPF: NORMAL /HPF (ref 0–10)
GLUCOSE UR QL: NEGATIVE
HGB UR QL STRIP: NEGATIVE
KETONES UR QL STRIP: NEGATIVE
LEUKOCYTE ESTERASE UR QL STRIP: NEGATIVE
MICRO URNS: NORMAL
MICRO URNS: NORMAL
NITRITE UR QL STRIP: NEGATIVE
PH UR STRIP: 7.5 [PH] (ref 5–7.5)
PROT UR QL STRIP: NEGATIVE
RBC #/AREA URNS HPF: NORMAL /HPF (ref 0–2)
SP GR UR: 1.02 (ref 1–1.03)
URINALYSIS REFLEX: NORMAL
UROBILINOGEN UR STRIP-MCNC: 0.2 MG/DL (ref 0.2–1)
WBC #/AREA URNS HPF: NORMAL /HPF (ref 0–5)

## 2021-07-28 DIAGNOSIS — L40.9 PSORIASIS: ICD-10-CM

## 2021-07-28 RX ORDER — CALCIPOTRIENE 50 UG/G
CREAM TOPICAL 2 TIMES DAILY
Qty: 120 G | Refills: 2 | Status: SHIPPED | OUTPATIENT
Start: 2021-07-28 | End: 2022-11-09

## 2022-04-13 ENCOUNTER — TELEPHONE (OUTPATIENT)
Dept: INTERNAL MEDICINE | Facility: CLINIC | Age: 42
End: 2022-04-13

## 2022-07-31 NOTE — TELEPHONE ENCOUNTER
Caller: Eriberto Cleveland    Relationship: Self    Best call back number: [phone]     What orders are you requesting (i.e. lab or imaging): FASTING LAB ORDERS FOR A PHYSICAL     In what timeframe would the patient need to come in: ON 09/23/22 IN THE MORNING     Where will you receive your lab/imaging services: IN OFFICE    Additional notes:      PATIENT HAS A PHYSICAL SCHEDULED FOR 09/30/22 AND WANTS TO GO AHEAD AND SCHEDULE LABS DUE TO A BUSY WORK SCHEDULE    PLEASE CALL AND ADVISE   
Please call to schedule   
No

## 2022-09-22 DIAGNOSIS — E78.5 DYSLIPIDEMIA: ICD-10-CM

## 2022-09-22 DIAGNOSIS — E55.9 AVITAMINOSIS D: ICD-10-CM

## 2022-09-22 DIAGNOSIS — Z00.00 ANNUAL PHYSICAL EXAM: Primary | ICD-10-CM

## 2022-09-24 LAB
25(OH)D3+25(OH)D2 SERPL-MCNC: 44 NG/ML (ref 30–100)
ALBUMIN SERPL-MCNC: 4.7 G/DL (ref 3.5–5.2)
ALBUMIN/CREAT UR: 4 MG/G CREAT (ref 0–29)
ALBUMIN/GLOB SERPL: 2.8 G/DL
ALP SERPL-CCNC: 50 U/L (ref 39–117)
ALT SERPL-CCNC: 30 U/L (ref 1–41)
APPEARANCE UR: CLEAR
AST SERPL-CCNC: 14 U/L (ref 1–40)
BACTERIA #/AREA URNS HPF: NORMAL /HPF
BASOPHILS # BLD AUTO: 0.01 10*3/MM3 (ref 0–0.2)
BASOPHILS NFR BLD AUTO: 0.2 % (ref 0–1.5)
BILIRUB SERPL-MCNC: 0.6 MG/DL (ref 0–1.2)
BILIRUB UR QL STRIP: NEGATIVE
BUN SERPL-MCNC: 13 MG/DL (ref 6–20)
BUN/CREAT SERPL: 15.5 (ref 7–25)
CALCIUM SERPL-MCNC: 9.8 MG/DL (ref 8.6–10.5)
CASTS URNS QL MICRO: NORMAL /LPF
CHLORIDE SERPL-SCNC: 105 MMOL/L (ref 98–107)
CHOLEST SERPL-MCNC: 170 MG/DL (ref 0–200)
CO2 SERPL-SCNC: 29.2 MMOL/L (ref 22–29)
COLOR UR: YELLOW
CREAT SERPL-MCNC: 0.84 MG/DL (ref 0.76–1.27)
CREAT UR-MCNC: 117.4 MG/DL
EGFRCR SERPLBLD CKD-EPI 2021: 111.7 ML/MIN/1.73
EOSINOPHIL # BLD AUTO: 0.06 10*3/MM3 (ref 0–0.4)
EOSINOPHIL NFR BLD AUTO: 1.2 % (ref 0.3–6.2)
EPI CELLS #/AREA URNS HPF: NORMAL /HPF (ref 0–10)
ERYTHROCYTE [DISTWIDTH] IN BLOOD BY AUTOMATED COUNT: 13 % (ref 12.3–15.4)
GLOBULIN SER CALC-MCNC: 1.7 GM/DL
GLUCOSE SERPL-MCNC: 100 MG/DL (ref 65–99)
GLUCOSE UR QL STRIP: NEGATIVE
HBA1C MFR BLD: 5 % (ref 4.8–5.6)
HCT VFR BLD AUTO: 44.7 % (ref 37.5–51)
HDLC SERPL-MCNC: 45 MG/DL (ref 40–60)
HGB BLD-MCNC: 15.5 G/DL (ref 13–17.7)
HGB UR QL STRIP: NEGATIVE
IMM GRANULOCYTES # BLD AUTO: 0.02 10*3/MM3 (ref 0–0.05)
IMM GRANULOCYTES NFR BLD AUTO: 0.4 % (ref 0–0.5)
KETONES UR QL STRIP: NEGATIVE
LDLC SERPL CALC-MCNC: 109 MG/DL (ref 0–100)
LEUKOCYTE ESTERASE UR QL STRIP: NEGATIVE
LYMPHOCYTES # BLD AUTO: 0.8 10*3/MM3 (ref 0.7–3.1)
LYMPHOCYTES NFR BLD AUTO: 16.2 % (ref 19.6–45.3)
MCH RBC QN AUTO: 29.4 PG (ref 26.6–33)
MCHC RBC AUTO-ENTMCNC: 34.7 G/DL (ref 31.5–35.7)
MCV RBC AUTO: 84.8 FL (ref 79–97)
MICRO URNS: NORMAL
MICRO URNS: NORMAL
MICROALBUMIN UR-MCNC: 4.5 UG/ML
MONOCYTES # BLD AUTO: 0.46 10*3/MM3 (ref 0.1–0.9)
MONOCYTES NFR BLD AUTO: 9.3 % (ref 5–12)
NEUTROPHILS # BLD AUTO: 3.58 10*3/MM3 (ref 1.7–7)
NEUTROPHILS NFR BLD AUTO: 72.7 % (ref 42.7–76)
NITRITE UR QL STRIP: NEGATIVE
NRBC BLD AUTO-RTO: 0 /100 WBC (ref 0–0.2)
PH UR STRIP: 6 [PH] (ref 5–7.5)
PLATELET # BLD AUTO: 280 10*3/MM3 (ref 140–450)
POTASSIUM SERPL-SCNC: 4.5 MMOL/L (ref 3.5–5.2)
PROT SERPL-MCNC: 6.4 G/DL (ref 6–8.5)
PROT UR QL STRIP: NEGATIVE
RBC # BLD AUTO: 5.27 10*6/MM3 (ref 4.14–5.8)
RBC #/AREA URNS HPF: NORMAL /HPF (ref 0–2)
SODIUM SERPL-SCNC: 142 MMOL/L (ref 136–145)
SP GR UR STRIP: 1.02 (ref 1–1.03)
TRIGL SERPL-MCNC: 85 MG/DL (ref 0–150)
TSH SERPL DL<=0.005 MIU/L-ACNC: 0.77 UIU/ML (ref 0.27–4.2)
URINALYSIS REFLEX: NORMAL
UROBILINOGEN UR STRIP-MCNC: 0.2 MG/DL (ref 0.2–1)
VLDLC SERPL CALC-MCNC: 16 MG/DL (ref 5–40)
WBC # BLD AUTO: 4.93 10*3/MM3 (ref 3.4–10.8)
WBC #/AREA URNS HPF: NORMAL /HPF (ref 0–5)

## 2022-11-09 ENCOUNTER — OFFICE VISIT (OUTPATIENT)
Dept: INTERNAL MEDICINE | Facility: CLINIC | Age: 42
End: 2022-11-09

## 2022-11-09 VITALS
SYSTOLIC BLOOD PRESSURE: 118 MMHG | DIASTOLIC BLOOD PRESSURE: 72 MMHG | HEART RATE: 90 BPM | OXYGEN SATURATION: 100 % | TEMPERATURE: 98.5 F | HEIGHT: 70 IN | WEIGHT: 157.2 LBS | BODY MASS INDEX: 22.5 KG/M2

## 2022-11-09 DIAGNOSIS — E78.5 DYSLIPIDEMIA: ICD-10-CM

## 2022-11-09 DIAGNOSIS — M77.41 METATARSALGIA OF BOTH FEET: ICD-10-CM

## 2022-11-09 DIAGNOSIS — D18.09 HEMANGIOMA OF SPINE: ICD-10-CM

## 2022-11-09 DIAGNOSIS — Z23 NEEDS FLU SHOT: ICD-10-CM

## 2022-11-09 DIAGNOSIS — L40.9 PSORIASIS: ICD-10-CM

## 2022-11-09 DIAGNOSIS — M19.072 OSTEOARTHRITIS OF FIRST METATARSOPHALANGEAL (MTP) JOINT OF BOTH FEET: ICD-10-CM

## 2022-11-09 DIAGNOSIS — N42.89 ASYMMETRIC PROSTATE: ICD-10-CM

## 2022-11-09 DIAGNOSIS — E55.9 AVITAMINOSIS D: ICD-10-CM

## 2022-11-09 DIAGNOSIS — M77.42 METATARSALGIA OF BOTH FEET: ICD-10-CM

## 2022-11-09 DIAGNOSIS — Z00.01 ENCOUNTER FOR GENERAL ADULT MEDICAL EXAMINATION WITH ABNORMAL FINDINGS: Primary | ICD-10-CM

## 2022-11-09 DIAGNOSIS — M19.071 OSTEOARTHRITIS OF FIRST METATARSOPHALANGEAL (MTP) JOINT OF BOTH FEET: ICD-10-CM

## 2022-11-09 PROCEDURE — 99396 PREV VISIT EST AGE 40-64: CPT | Performed by: INTERNAL MEDICINE

## 2022-11-09 PROCEDURE — 90686 IIV4 VACC NO PRSV 0.5 ML IM: CPT | Performed by: INTERNAL MEDICINE

## 2022-11-09 PROCEDURE — 90471 IMMUNIZATION ADMIN: CPT | Performed by: INTERNAL MEDICINE

## 2022-11-09 NOTE — PROGRESS NOTES
"Annual Exam (CPE. Review of Medical Issues) and Foot Pain      HPI  Eriberto Cleveland is a 42 y.o. male RTC in yearly CPE, review of medical issues: health has been 'great'.   1. Psoriasis, limited disease to B elbow extensor surfaces and single R lower leg spot - was controlled on calcipotrene only, off steroid cream.  Has been off for months and no recurrence. \"Fine, gone\".  Though has some small spots that will come on hand from time to time. Wonders if should see derm. No preference.   2. Bilateral foot pain, focal symmetric 2nd MTP pain s/p MRI and injx with ortho -  Recall failed splinting prior with podiatry.  S/P rheum eval and has seen ortho with MRI oif foot.  Has seen ortho a few times with Dr. Simon an offered procedure, but pt is hesitant.  Is affecting QOL and unable to do exercises that he wants to do. Wants to retrial PT. No meds used daily; really will not have much pain if never run or jump.  Does yoga daily, restricts lunging.   3. Vitamin D deficiency - off 1000 I.U. Vitamin D3 OTC daily since last visit.    4. HM - needs Flu today; asks about COVID updated booster.      Review of Systems   Constitutional: Positive for weight loss (noted over last  year; is avoiding snacks/ exercising consistently. Feels like intentional; added calories at 150# and gained some back. ). Negative for chills, fever, malaise/fatigue and weight gain.   HENT: Negative for congestion, hearing loss, odynophagia and sore throat.    Eyes: Negative for discharge, double vision, pain and redness.   Cardiovascular: Negative for chest pain, dyspnea on exertion, irregular heartbeat, leg swelling, near-syncope, palpitations and syncope.   Respiratory: Negative for cough and shortness of breath.    Endocrine: Negative for polydipsia, polyphagia and polyuria.   Hematologic/Lymphatic: Negative for bleeding problem. Does not bruise/bleed easily.   Skin: Positive for rash (on R hand, controlled if uses topical triamcinolone). Negative " for suspicious lesions.   Musculoskeletal: Positive for joint pain (R foot only at 1st MTP <<< 2 nd MTP). Negative for joint swelling, muscle cramps, muscle weakness and myalgias.   Gastrointestinal: Negative for constipation, diarrhea, dysphagia, heartburn, nausea and vomiting.   Genitourinary: Negative for dysuria, frequency, hematuria, hesitancy and incomplete emptying.   Neurological: Negative for dizziness, headaches and light-headedness.   Psychiatric/Behavioral: Negative for depression. The patient does not have insomnia and is not nervous/anxious.    Allergic/Immunologic: Negative for environmental allergies and persistent infections.       Problem List:    Patient Active Problem List   Diagnosis   • Dyslipidemia   • Asymmetric prostate   • Avitaminosis D   • DDD (degenerative disc disease), lumbar   • Hemangioma of spine   • Psoriasis   • Metatarsalgia of both feet   • Osteoarthritis of first metatarsophalangeal (MTP) joint of both feet       Medical History:    Past Medical History:   Diagnosis Date   • Asymmetric prostate    • COVID-19 05/2022   • Dermatitis, eczematoid 12/27/2016    Description: R >> L arm, recurrent   • Dyslipidemia     low hdl diet/exericse mgmnt rec'd   • Eczematous dermatitis     R>>L arm recurrent   • History of viral meningitis     2012   • Lumbar radiculopathy 10/25/2019   • Numbness of right anterior thigh 11/15/2018   • Prostate nodule    • Vitamin D deficiency         Social History:    Social History     Socioeconomic History   • Marital status:    • Number of children: 2   Tobacco Use   • Smoking status: Former   • Smokeless tobacco: Never   • Tobacco comments:     8 pk/yr hx quit at age 25   Vaping Use   • Vaping Use: Never used   Substance and Sexual Activity   • Alcohol use: Yes     Comment: 4-6 drinks month; none since 2020; 2022 1 drink/month   • Drug use: No   • Sexual activity: Yes     Partners: Female     Comment: wife only; no hx STD's       Family History:    Family History   Problem Relation Age of Onset   • Hyperlipidemia Father    • Hypertension Father    • Valvular heart disease Father    • Hypertension Brother    • Crohn's disease Brother    • Heart attack Paternal Grandfather    • No Known Problems Son    • Heart attack Cousin         s/p PTCA   • Heart attack Paternal Uncle         s/p CABG x 4       Surgical History: History reviewed. No pertinent surgical history.      Current Outpatient Medications:   •  Cholecalciferol (VITAMIN D-3) 1000 units capsule, Take  by mouth., Disp: , Rfl:     Vitals:    11/09/22 0925   BP: 118/72   Pulse: 90   Temp: 98.5 °F (36.9 °C)   SpO2: 100%     Body mass index is 22.56 kg/m².    Physical Exam  Vitals reviewed.   Constitutional:       General: He is not in acute distress.     Appearance: Normal appearance. He is well-developed. He is not ill-appearing or toxic-appearing.   HENT:      Head: Normocephalic and atraumatic.      Right Ear: Hearing, tympanic membrane, ear canal and external ear normal. There is no impacted cerumen.      Left Ear: Hearing, tympanic membrane, ear canal and external ear normal. There is no impacted cerumen.      Nose: Nose normal.      Mouth/Throat:      Mouth: Mucous membranes are moist. No oral lesions.      Tongue: No lesions.      Pharynx: Oropharynx is clear. Uvula midline. No pharyngeal swelling, oropharyngeal exudate, posterior oropharyngeal erythema or uvula swelling.   Eyes:      General: Lids are normal. No scleral icterus.        Right eye: No discharge.         Left eye: No discharge.      Extraocular Movements: Extraocular movements intact.      Conjunctiva/sclera: Conjunctivae normal.      Pupils: Pupils are equal, round, and reactive to light.   Neck:      Thyroid: No thyroid mass or thyromegaly.      Vascular: No carotid bruit.   Cardiovascular:      Rate and Rhythm: Normal rate and regular rhythm.      Pulses:           Radial pulses are 2+ on the right side and 2+ on the left side.         Dorsalis pedis pulses are 2+ on the right side and 2+ on the left side.        Posterior tibial pulses are 2+ on the right side and 2+ on the left side.      Heart sounds: Normal heart sounds, S1 normal and S2 normal. No murmur heard.    No friction rub. No gallop.   Pulmonary:      Effort: Pulmonary effort is normal. No respiratory distress.      Breath sounds: Normal breath sounds. No wheezing, rhonchi or rales.   Abdominal:      General: Bowel sounds are normal. There is no distension.      Palpations: Abdomen is soft. There is no mass.      Tenderness: There is no abdominal tenderness. There is no guarding or rebound.   Genitourinary:     Prostate: Normal. Not enlarged, not tender and no nodules present (minimal asymmetry noted, no masses).      Rectum: Normal. No external hemorrhoid. Normal anal tone.   Musculoskeletal:         General: No deformity. Normal range of motion.      Cervical back: Full passive range of motion without pain, normal range of motion and neck supple.      Right lower leg: No edema.      Left lower leg: No edema.   Lymphadenopathy:      Cervical: No cervical adenopathy.      Right cervical: No superficial, deep or posterior cervical adenopathy.     Left cervical: No superficial, deep or posterior cervical adenopathy.      Upper Body:      Right upper body: No supraclavicular, axillary or pectoral adenopathy.      Left upper body: No supraclavicular, axillary or pectoral adenopathy.   Skin:     General: Skin is warm and dry.      Findings: No rash.   Neurological:      Mental Status: He is alert and oriented to person, place, and time.      Cranial Nerves: No cranial nerve deficit.      Sensory: No sensory deficit.      Motor: No weakness, tremor, atrophy or abnormal muscle tone.      Gait: Gait normal.      Deep Tendon Reflexes: Reflexes are normal and symmetric.      Reflex Scores:       Patellar reflexes are 2+ on the right side and 2+ on the left side.       Achilles reflexes are  2+ on the right side and 2+ on the left side.  Psychiatric:         Attention and Perception: Attention normal.         Mood and Affect: Mood normal.         Speech: Speech normal.         Behavior: Behavior normal. Behavior is cooperative.         Thought Content: Thought content normal.         Assessment/ Plan  Diagnoses and all orders for this visit:    Encounter for general adult medical examination with abnormal findings    Psoriasis  -     Ambulatory Referral to Dermatology    Metatarsalgia of both feet  -     Ambulatory Referral to Dermatology    Hemangioma of spine    Dyslipidemia    Avitaminosis D    Asymmetric prostate    Needs flu shot  -     FluLaval/Fluarix/Fluzone >6 Months    Osteoarthritis of first metatarsophalangeal (MTP) joint of both feet  -     Ambulatory Referral to Dermatology        Return in about 1 year (around 11/9/2023) for Annual physical.      Discussion:  Eriberto Cleveland is a 42 y.o. male RTC in yearly CPE, review of medical issues:  1. Recurrent sore throat, severe;  similar event in 3/2020 - infectious eval in 2/2021 (-).  Saw ENT and had laryngoscopy that was reported negative.  Resolved with PPI use, no recurrence.   2. Psoriasis, limited disease to B elbow extensor surfaces (resolved), R lower leg (resolved), and B dorsal hands (controlled on topical triamcinolone) - off daily calcipotrene, controlled on elbows, has spots on hands that 'will come back if I dont use the triamcinolone'.  Still remnant question of relation of psoriasis dx with issues in #3.  Will refer on to derm today for consult and eval, ? Of consideration for biologics.   3. Bilateral foot pain, focal symmetric 2nd MTP pain s/p MRI and injx with ortho;  failed splinting prior with podiatry - S/P rheum eval with noted negative lab eval and MRI with OA features.  No tx advised but did make mention in note that any response to biologics for psorisis would be interesting.   Pt considering surgery with ortho,   Alfred, but will consult with derm for systemmic/ biologic tx consideration first.   4. L spine hemangioma - stable on 4/2019 imaging, no additional eval needed.   5. Vitamin D deficiency - replete, off 1000 I.U. Vitamin D3 OTC.  C/W outdoor exposure.   6. Prostate Asymmetry on R - s/p urology eval 4/ 2016 and 6/2017.  Minimal on exam today.  Monitor.   7. Hx of Dyslipidemia, with hx low HDL - HDL > 40, LDL improved with TLC mods.  C/W TLC, no meds advised.   8. HM - labs d/w pt; Flu - today; Tdap/ COVID/ Hep A - UTD; COVID updated booster ASAP at pharmacy; [facility] OK; C-scope at 45; Hep C Ab (-) 5/2021; c/w exercise

## 2023-01-30 ENCOUNTER — E-VISIT (OUTPATIENT)
Dept: FAMILY MEDICINE CLINIC | Facility: TELEHEALTH | Age: 43
End: 2023-01-30
Payer: COMMERCIAL

## 2023-01-30 PROCEDURE — BRIGHTMDVISIT: Performed by: NURSE PRACTITIONER

## 2023-01-30 NOTE — E-VISIT TREATED
Chief Complaint: Cold, flu, COVID, sinus, hay fever, or seasonal allergies   Patient introduction   Patient is 42-year-old male with cough, nasal discharge, and fatigue that started 10 to 14 days ago. Regarding date of symptom onset, patient writes: 12/20/23.   COVID-19 exposure, testing history, and vaccination status:    No known exposure to a person with a confirmed or suspected case of COVID-19.    No recent travel outside of their local community.    Patient did a self-test within the last 24 hours. Test result was negative.    Has not received a COVID-19 vaccination.   Patient did not request an excuse note.   General presentation   No improvement of symptoms. Symptoms came on gradually.   Fever:    No fever.   Sinus and nasal symptoms:    Nasal discharge.    Yellow nasal drainage.    Nasal drainage is thick.    Postnasal drip.    No nasal or sinus congestion.    No itchy nose or sneezing.   Throat symptoms:    No sore throat.   Head and body aches:    Fatigue.    No headache.    No sweats.    No chills.    No myalgia.   Cough:    Cough is worse at night/while sleeping.    Cough is mildly productive of sputum.    Describes color of sputum as yellow.   Wheezing and shortness of breath:    No COPD diagnosis.    No asthma diagnosis.    No wheezing.    No shortness of breath.    No previous albuterol inhaler use for URIs, bronchitis, or pneumonia.    No previous steroid inhaler use for URIs, bronchitis, or pneumonia.   Chest pain:    No chest pain.   Ear symptoms:    Current symptoms include pain and pressure in the ear(s).   Dizziness:    No dizziness.   Allergies:    No history of allergies.   Flu exposure:    No recent known exposure to a person with a confirmed flu diagnosis.    Received a flu vaccine in the last 1 to 3 months.   Patient is taking over-the-counter medications for current symptoms, including dextromethorphan, guaifenesin, and loratadine.   Review of red flags/alarm symptoms:    No changes in  alertness or awareness.    No decreased urination.   Self-exam:    Height: 180 centimeters    Weight: 68.9 kilograms    No difficulty moving their chin toward their chest.    Neck lymph nodes feel normal.    Has not taken antibiotics for similar symptoms within the past month.   Current medications   Not taking other medications or supplements.   Medication allergies   None.   Medication contraindication review   No history of anaphylactic reaction to beta-lactam antibiotics; aspirin triad; blood dyscrasia; bone marrow depression; catecholamine-releasing paraganglioma; coronary artery disease; coagulation disorder; congenital long QT syndrome; depression; electrolyte abnormalities; fungal infection; GI bleeding; GI obstruction; G6PD deficiency; heart arrhythmia; hypertension; mononucleosis; myasthenia; recent myocardial infarction; NSAID-induced asthma/urticaria; Parkinson's disease; pheochromocytoma; porphyria; Reye syndrome; seizure disorder; ulcerative colitis; and urinary retention.   No history of metoclopramide-associated dystonic reaction and tardive dyskinesia.   No known history of amoxicillin-clavulanate-associated cholestatic jaundice or hepatic impairment.   No known history of azithromycin-associated cholestatic jaundice or hepatic impairment.   Past medical history   Immune conditions: No immunocompromising conditions. No history of cancer.   Social history   Never smoked tobacco.   Assessment   Bacterial sinusitis.   This is the likely diagnosis based on patient's interview responses and symptoms, including:    Thick nasal discharge.    Yellow or green mucus.    Duration of symptoms longer than 10 days.    Symptoms have not improved.   Plan   Medications:    prednisone 20 mg tablet RX 20mg 2 tabs PO qd 5d for asthma exacerbation. Amount is 10 tab.    amoxicillin 875 mg-potassium clavulanate 125 mg tablet RX 875mg/125mg 1 tab PO bid 7d for infection. This medication is an antibiotic. Take it exactly as  directed. You must finish the entire course of medication, even if you feel better after the first few days of treatment. Amount is 14 tab.    fluticasone 50 mcg/actuation nasal spray,suspension OTC 50mcg 2 sprays each nostril nasal qd 30d for nasal symptoms due to allergies or sinusitis. Fluticasone needs to be used every day to be effective. It may take up to a week for the full effects of the medication to be seen. Brands to look for include Flonase. Amount is 16 g.   The patient's prescriptions will be sent to:   [facility] #[id number]   [address] [address]   Phone: [phone]     Fax: [phone]   Patient informed to purchase OTC medication.   Education:    Condition and causes    Prevention    Treatment and self-care    When to call provider   ----------   Electronically signed by DEL Stephenson on 2023-01-30 at 10:07AM   ----------   Patient Interview Transcript:   Please carefully consider each question and answer as best you can. This helps your provider give you the best care. Which of these symptoms are bothering you? Select all that apply.    Cough    Runny nose    Fatigue or tiredness   Not selected:    Shortness of breath    Fever    Stuffed-up nose or sinuses    Itchy or watery eyes    Itchy nose or sneezing    Loss of smell or taste    Sore throat    Hoarse voice or loss of voice    Headache    Sweats    Chills    Muscle or body aches    Nausea or vomiting    Diarrhea    I don't have any of these symptoms   Since your current symptoms started, have you had a viral test for COVID-19? - This includes home self-tests as well as nose swab or saliva tests done at a doctor's office, lab, or testing site. - It does NOT include antibody tests, which use a blood sample to test for past infection. Select one.    Yes   Not selected:    No   When was your most recent COVID-19 test? Select one.    Within the last 24 hours   Not selected:    Within the last week (specify date as  "MM/DD/YY)    7 to 14 days ago    15 to 30 days ago    More than 1 month ago   What type of COVID-19 test did you most recently have? Select one.    Viral self-test or home test   Not selected:    Viral test at a doctor's office, lab, or testing site   What was the result of your most recent COVID-19 test? Select one.    Negative   Not selected:    Positive   Have you gotten the COVID-19 vaccine? Select one.    No   Not selected:    Yes   In the last 14 days, have you traveled outside of your local community? This includes travel by car, RV, bus, train, or plane. Travel increases your chances of getting and spreading COVID-19. Select one.    No   Not selected:    Yes   In the last 14 days, have you had close contact with someone who has coronavirus (COVID-19)? \"Close contact\" means any of these: - Living in the same household as someone with COVID-19. - Caring for someone with COVID-19. - Being within 6 feet of someone with COVID-19 for a total of at least 15 minutes over a 24-hour period. For example, three 5-minute exposures for a total of 15 minutes. - Being in direct contact with respiratory droplets from someone with COVID-19 (being coughed on, kissing, sharing utensils). Select one.    No, not that I know of   Not selected:    Yes, a confirmed case    Yes, a suspected case   When did your current symptoms start? Select one.    10 to 14 days ago   Not selected:    Less than 48 hours ago    3 to 5 days ago    6 to 9 days ago    2 to 3 weeks ago    3 to 4 weeks ago    More than a month ago   Do you know the exact date your symptoms started? If so, enter the date as MM/DD/YY. Select one.    Yes (specify): 12/20/23   Not selected:    No   Did your symptoms come on suddenly or gradually? Select one.    Gradually   Not selected:    Suddenly    I'm not sure   Have your symptoms improved at all since they began? Select one.    No   Not selected:    Yes, but they haven't gone away completely    Yes, but then they came " "back worse than before    I'm not sure   Do you cough so hard that it's made you gag or vomit? By gag, we mean has your coughing made you choke or dry heave? Select all that apply.    No   Not selected:    Yes, my coughing has made me gag    Yes, my coughing has made me vomit   When is your cough the worst? Select all that apply.    At nighttime, or while I'm sleeping   Not selected:    In the morning, or when I wake up    During the day    I haven't noticed a difference depending on time of day   Are you coughing up mucus or phlegm? Select one.    Yes, a little   Not selected:    No, my cough is dry    Yes, a lot   What color is most of the mucus or phlegm that you're coughing up? Select one.    Yellow   Not selected:    Clear    White/frothy    Green    Red or pink    I'm not sure   What color is your nasal drainage? Select one.    Yellow   Not selected:    Clear    White    Green    My nose is stuffed but not draining or running    I'm not sure   Is your nasal drainage thick or thin? Select one.    Thick   Not selected:    Thin   Is there any drainage (mucus) going down the back of your throat? This kind of drainage is also called \"postnasal drip.\" Select one.    Yes   Not selected:    No, not that I know of   Since your symptoms started, have you felt dizzy? Select one.    No   Not selected:    Yes, but I can continue with my regular daily activities    Yes, and it makes it hard to stand, walk, or do daily activities   Do you have chest pain? You might also feel it as discomfort, aching, tightness, or squeezing in the chest. Select one.    No   Not selected:    Yes   Have you urinated at least 3 times in the last 24 hours? Select one.    Yes   Not selected:    No   Changes in alertness or awareness may mean you need emergency care. Since your symptoms started, have you had any of these? Select all that apply.    None of the above   Not selected:    Confusion    Slurred speech    Not knowing where you are or what " day it is    Difficulty staying conscious    Fainting or passing out   Do your symptoms include a whistling sound, or wheezing, when you breathe? Select one.    No   Not selected:    Yes    I'm not sure   Do you have any of these symptoms in your ear(s)? Select all that apply.    Pain    Pressure   Not selected:    Fullness    Crackling or popping    Plugged or blocked sensation    None of the above   Can you move your chin toward your chest?    Yes   Not selected:    No, my neck is too stiff   Are your glands/lymph nodes swollen, or does it hurt when you touch them?    No, not that I can tell   Not selected:    Yes   In the past week, has anyone around you (such as at school, work, or home) had a confirmed diagnosis of the flu? A confirmed diagnosis means that a nose swab was done to verify a flu infection. Select all that apply.    No, not that I know of   Not selected:    I live with someone who has the flu    I've been within touching distance of someone who has the flu    I've walked by, or sat about 3 feet away from, someone who has the flu    I've been in the same building as someone who has the flu   Have you ever been diagnosed with asthma? Select one.    No   Not selected:    Yes   Have you ever been prescribed albuterol to use for wheezing, cough, or shortness of breath caused by a cold, bronchitis, or pneumonia? Albuterol (ProAir, Proventil, Ventolin) is prescribed as an inhaler or a solution to be used with a nebulizer machine. Select one.    No, not that I know of   Not selected:    Yes   Have you ever been prescribed a steroid inhaler to use for wheezing, cough, or shortness of breath caused by a cold, bronchitis, or pneumonia? Some examples of steroid inhalers include Pulmicort, Flovent, Qvar, and Alvesco. Select one.    No, not that I know of   Not selected:    Yes   Have you ever been diagnosed with chronic obstructive pulmonary disease (COPD)? Select one.    No, not that I know of   Not  selected:    Yes   In the past month, have you taken antibiotics for similar symptoms? Examples of antibiotics include amoxicillin, amoxicillin-clavulanate (Augmentin), penicillin, cefdinir (Omnicef), doxycycline, and clindamycin (Cleocin). Select one.    No   Not selected:    Yes   Do you have allergies (pollen, dust mites, mold, animal dander)? Select one.    No, not that I know of   Not selected:    Yes   Have you had a flu shot this season? Select one.    Yes, 1 to 3 months ago   Not selected:    Yes, less than 2 weeks ago    Yes, 2 to 4 weeks ago    Yes, 3 to 6 months ago    Yes, more than 6 months ago    No   The flu and COVID-19 can be more serious for people with certain conditions or characteristics. These questions help us figure out if you or anyone you live with is at higher risk for complications from these infections. Do either of these statements apply to you? Select all that apply.    None of the above   Not selected:    I'm  or Native [place]    I'm a healthcare worker   Do you smoke tobacco? Select one.    No   Not selected:    Yes, every day    Yes, some days    No, I quit   Do you have any of these conditions? Select all that apply.    None of the above   Not selected:    Chronic lung disease, such as cystic fibrosis or interstitial fibrosis    Heart disease, such as congenital heart disease, congestive heart failure, or coronary artery disease    Disorder of the brain, spinal cord, or nerves and muscles, such as dementia, cerebral palsy, epilepsy, muscular dystrophy, or developmental delay    Metabolic disorder or mitochondrial disease    Cerebrovascular disease, such as stroke or another condition affecting the blood vessels or blood supply to the brain    Down syndrome    Mood disorder, including depression or schizophrenia spectrum disorders    Substance use disorder, such as alcohol, opioid, or cocaine use disorder    Tuberculosis   Do you live in a group care setting?  Examples include: - Nursing home - Residential care - Psychiatric treatment facility - Group home - [facility] - Board and care home - Homeless shelter - Foster care setting Select one.    No   Not selected:    Yes   Are you a healthcare worker? Select one.    No   Not selected:    Yes   People with a very high body mass index (BMI) are at higher risk for developing complications from the flu and severe illness from COVID-19. To determine your BMI, we need to know your weight and height. Please enter your weight (in pounds).    Weight   Please enter your height.    Height   Do you have any of these conditions that can affect the immune system? Scroll to see all options. Select all that apply.    None of these   Not selected:    History of bone marrow transplant    Chronic kidney disease    Chronic liver disease (including cirrhosis)    HIV/AIDS    Inflammatory bowel disease (Crohn's disease or ulcerative colitis)    Lupus    Moderate to severe plaque psoriasis    Multiple sclerosis    Rheumatoid arthritis    Sickle cell anemia    Alpha or beta thalassemia    History of solid organ transplant (kidney, liver, or heart)    History of spleen removal    An autoimmune disorder not listed here    A condition requiring treatment with long-term use of oral steroids (such as prednisone, prednisolone, or dexamethasone)   Have you ever been diagnosed with cancer? Select one.    No   Not selected:    Yes, I have cancer now    Yes, but I'm in remission   Do any of these apply to you? Select all that apply.    None of the above   Not selected:    I've been hospitalized within the last 5 days    I have diabetes    I'm in close contact with a child in    Do any of these apply to the people who live with you? Select all that apply.    None of the above   Not selected:    A child under the age of 5    An adult 65 or older    A person who is pregnant    A person who has given birth, had a miscarriage, had a pregnancy loss, or had  an  in the last 2 weeks    An  or Native [place]   Does any member of your household have any of these medical conditions? Select all that apply.    None of the above   Not selected:    Asthma    Disorders of the brain, spinal cord, or nerves and muscles, such as dementia, cerebral palsy, epilepsy, muscular dystrophy, or developmental delay    Chronic lung disease, such as COPD or cystic fibrosis    Heart disease, such as congenital heart disease, congestive heart failure, or coronary artery disease    Cerebrovascular disease, such as stroke or another condition affecting the blood vessels or blood supply to the brain    Blood disorders, such as sickle cell disease    Diabetes    Metabolic disorders such as inherited metabolic disorders or mitochondrial disease    Kidney disorders    Liver disorders    Weakened immune system due to illness or medications such as chemotherapy or steroids    Children under the age of 19 who are on long-term aspirin therapy    Extreme obesity (BMI > 40)   Do you have any of these conditions? Scroll to see all options. Select all that apply.    None of the above   Not selected:    Aspirin triad (also known as Samter's triad or ASA triad)    Asthma or hives from taking aspirin or other NSAIDs, such as ibuprofen or naproxen    Blockage or narrowing of the blood vessels of the heart    Blood clotting disorder    Blood dyscrasia, such anemia, leukemia, lymphoma, or myeloma    Bone marrow depression    Catecholamine-releasing paraganglioma    Congenital long QT syndrome    Depression    Difficulty urinating or completely emptying your bladder    Uncorrected electrolyte abnormalities    Fungal infection    Gastrointestinal (GI) bleeding    Gastrointestinal (GI) obstruction    G6PD deficiency    Recent heart attack    High blood pressure    Irregular heartbeat or heart rhythm    Mononucleosis (mono)    Myasthenia gravis    Parkinson's disease    Pheochromocytoma    Reye  syndrome    Seizure disorder    Thyroid disease    Ulcerative colitis   Have you ever had either of these conditions? Select all that apply.    No   Not selected:    Metoclopramide-associated dystonic reaction    Tardive dyskinesia   Just a few more questions about medications, and then you're finished. Have you used any non-prescription medications or nasal sprays for your current symptoms? Examples include saline sprays, decongestants, NyQuil, and Tylenol. Select one.    Yes   Not selected:    No   Which of these non-prescription medications have you tried? Scroll to see all options. Select all that apply.    Dextromethorphan (Delsym, Robitussin, Vicks DayQuil Cough)    Guaifenesin (Mucinex)    Loratadine (Alavert, Claritin)   Not selected:    Acetaminophen (Tylenol)    Budesonide (Rhinocort)    Cetirizine (Zyrtec)    Chlorpheniramine (Aller-chlor, Chlor-Trimeton)    Cromolyn (NasalCrom)    Diphenhydramine (Benadryl)    Fexofenadine (Allegra)    Fluticasone (Flonase)    Guaifenesin/dextromethorphan (Delsym DM, Mucinex DM, Robitussin DM)    Ibuprofen (Advil, Motrin, Midol)    Ketotifen (Alaway, Zaditor)    Naphazoline-pheniramine (Naphcon-A, Opcon-A, Visine-A)    Omeprazole (Prilosec)    Oxymetazoline (Afrin)    Phenylephrine (Sudafed PE)    Triamcinolone (Nasacort)    None of the above   Have you taken any monoamine oxidase inhibitor (MAOI) medications in the last 14 days? Examples include rasagiline (Azilect), selegiline (Eldepryl, Zelapar), isocarboxazid (Marplan), phenelzine (Nardil), and tranylcypromine (Parnate). Select one.    No, not that I know of   Not selected:    Yes   Do you take Kynmobi or Apokyn (apomorphine)? Select one.    No   Not selected:    Yes   Are you taking any other medications, vitamins, or supplements? Select one.    No   Not selected:    Yes   Have you ever had an allergic or bad reaction to any medication? Select one.    No   Not selected:    Yes   Are you allergic to milk or to the  proteins found in milk (for example, whey or casein)? A milk allergy is different from lactose intolerance. Select one.    No, not that I know of   Not selected:    Yes   Have you ever had jaundice or liver problems as a result of taking amoxicillin-clavulanate (Augmentin)? Jaundice is a condition in which the skin and the whites of the eyes turn yellow. Select all that apply.    No, not that I know of   Not selected:    Yes, jaundice    Yes, liver problems   Have you ever had jaundice or liver problems as a result of taking azithromycin (Zithromax, Zmax)? Jaundice is a condition in which the skin and the whites of the eyes turn yellow. Select all that apply.    No, not that I know of   Not selected:    Yes, jaundice    Yes, liver problems   Do you need a doctor's note? A doctor's note confirms that you received care today and states when you can return to school or work. It does not contain information about your diagnosis or treatment plan. Your provider will make the final decision on whether to give you a doctor's note and for how long. Doctor's notes CANNOT be backdated. We can't provide medical leave paperwork through this type of visit. If more paperwork is needed to request time off, contact your primary care provider. Select one.    No   Not selected:    Today only (1 day)    Today and tomorrow (2 days)    3 days    5 days    7 days    10 days    14 days   Is there anything else you'd like to tell us about your symptoms?   The patient did not enter any additional information.   ----------   Medical history   Medical history data does not currently exist for this patient.

## 2023-01-30 NOTE — EXTERNAL PATIENT INSTRUCTIONS
"   Note   Romero Navarrete, I am sending in steroids and antibiotics to your pharmacy. The steroids say \"for asthma exacerbation\", but I cannot delete that from your summary so just ignore it. These will help decrease any inflammation and may also help with the fatigue, as they sometimes give people more energy. I hope you feel better soon. -Cici   Diagnosis   Bacterial sinusitis   My name is Cici August, and I'm a healthcare provider at [facility]. I reviewed your interview, and I see that you have bacterial sinusitis.   To prevent the spread of illness to others, I recommend that you stay home and away from other people as much as possible while you're sick. When you need to be around other people, consider wearing a face mask.   Medications   Your pharmacy   [facility]/pharmacy #[id number] [address] [phone]     Prescription   Prednisone (20mg): Take 2 tablets (40mg) by mouth once daily for 5 days for asthma exacerbation.   Amoxicillin-clavulanate (875mg/125mg): Take 1 tablet by mouth twice a day for 7 days for infection. This medication is an antibiotic. Take it exactly as directed. You must finish the entire course of medication, even if you feel better after the first few days of treatment.    I've prescribed a 5-day course of oral steroids to help with your asthma flare. If your symptoms don't start to improve after 2 to 3 doses, or if they get worse, follow up with your care team right away. Continue taking all of your other asthma medications.    Start taking the antibiotics I've prescribed right away. You need to finish the entire course of antibiotics, even if you start to feel better before the pills run out.   Non-prescription   Fluticasone (50mcg): Spray 2 times in each nostril daily for nasal symptoms due to allergies or sinusitis. Fluticasone needs to be used every day to be effective. It may take up to a week for the full effects of the medication to be seen. Brands to " look for include Flonase.   About your diagnosis   The sinuses are hollow spaces connected to the nasal passages. Sinusitis occurs when the sinuses swell and block the drainage of fluid and mucus from the nose, causing pain, pressure, and congestion. Fatigue, difficulty sleeping, or decreased appetite may accompany your symptoms.   More than 90% of sinus infections are caused by viruses. However, in certain cases, a sinus infection may be caused by bacteria. Bacterial sinus infections usually look like one of the following cases:    Severe sinus symptoms with a fever over 102F.    Sinus symptoms that have not improved at all after 10 days.    Cold symptoms that slowly improve but then worsen again after 5 or 6 days, usually with a high fever, headache, or nasal discharge.   What to expect   If you follow this treatment plan, you should start to feel better within a few days.   When to seek care   Call us at [phone]   with any sudden or unexpected symptoms.    Symptoms that last longer than 10 to 14 days.    Symptoms that get better for a few days, and then suddenly get worse.    Fever that measures over 103F or continues for more than 3 days.    Any vision changes.    A worsening headache.    Stiff neck.    Swelling of your forehead or eyes.    Coughing up red or bloody mucus.    Swallowing becomes extremely difficult or impossible.    More than 5 episodes of diarrhea in a day.    More than 5 episodes of vomiting in a day.    Severe shortness of breath.    Severe chest pain   Other treatment    Rest! Your body needs rest to recover and fight infection.    Drink plenty of water to stay hydrated.    Use steam to soothe your sinuses: Breathe it in from a shower or a bowl of hot water. Placing a warm, moist washcloth over your nose and forehead may help relieve the sinus pain and pressure.    Try non-prescription saline nasal sprays to help your nasal symptoms. If your nose or sinuses become very stuffy, try  using a Neti Pot to flush them out. Neti Pots are available at any drugstore without a prescription.    Avoid smoke and air pollution. Smoke can make infections worse.   Prevention    Avoid close contact with other people when you're sick.    Cover your mouth and nose when you cough or sneeze. Use a tissue or cough into your elbow. Make sure that used tissues go directly into the trash.    Avoid touching your eyes, nose, or mouth while you're sick.    Wash your hands often, especially after coughing, sneezing, or blowing your nose. If soap and water are not available, use an alcohol-based hand .    If you or someone in your home or workplace is sick, disinfect commonly used items. This includes door handles, tables, computers, remotes, and pens.    Coronavirus (COVID-19) information   Common symptoms of COVID-19 include fever, cough, shortness of breath, fatigue, muscle or body aches, headaches, new loss of sense of taste or smell, sore throat, stuffy or runny nose, nausea or vomiting, and diarrhea. Most people who get COVID-19 have mild symptoms and can rest at home until they get better. Elderly people and those with chronic medical problems may be at risk for more serious complications.   FAQs about the COVID-19 vaccine   There are four authorized COVID-19 vaccines: Alexi & Alexi's Mayte Vaccine (J&J/Mayte), Moderna, Novavax, and Pfizer-Fastmobile (Pfizer). The J&J/Mayte and Novavax vaccines are approved for use in people aged 18 and older. The Moderna and Pfizer vaccines are approved for those aged 6 months and older. All four are available at no cost. Even if you don't have health insurance, you can still get the COVID-19 vaccine for free.   Which vaccine is the best? Which vaccine should I get?   All four vaccines are highly effective. Even if you get COVID-19 after being vaccinated, all of the vaccines help prevent severe disease, hospitalization, and complications.   Most people should get  whichever vaccine is first available to them. However, women younger than 50 years old should consider the rare risk of blood clots with low platelets after vaccination with the J&J/Mayte vaccine. This risk hasn't been seen with the other three vaccines.   Are the vaccines safe?   Yes. Hundreds of millions of people in the [place] have already safely received COVID-19 vaccines under the most intense safety monitoring in the history of the [place].   Do I need the vaccine if I've already had COVID?   Yes. Vaccination helps protect you even if you've already had COVID.   If you had COVID-19 and had symptoms, wait to get vaccinated until you've recovered and completed your isolation period.   If you tested positive for COVID-19 but did not have symptoms, you can get vaccinated after 5 full days have passed since you had a positive test, as long as you don't develop symptoms.   How many doses of the vaccine do I need?   Visit www.cdc.gov/coronavirus/2019-ncov/vaccines/stay-up-to-date.html   to find out how to stay up to date with your COVID-19 vaccines.   I'm immunocompromised. How many doses of the vaccine do I need?   For information on how immunocompromised people can stay up to date with their COVID-19 vaccines, visit www.cdc.gov/coronavirus/2019-ncov/vaccines/recommendations/immuno.html  .   What are the common side effects of the vaccine?   A sore arm, tiredness, headache, and muscle pain may occur within two days of getting the vaccine and last a day or two. For the Moderna or Pfizer vaccines, side effects are more common after the second dose. People over the age of 55 are less likely to have side effects than younger people.   After I'm up to date on vaccines, can I still get or spread COVID?   Yes, you can still get COVID, but your disease should be milder. And your risk of serious illness, hospitalization, and complications will be much lower, especially if you're up to date. Unfortunately, you can still spread COVID  if you've been vaccinated. That's why it's important to follow isolation guidelines if you get sick or test positive.   After I'm up to date on vaccines, can I go back to normal?   You should still wear a mask indoors in public if:    It's required by laws, rules, regulations, or local guidance.    You have a weakened immune system.    Your age puts you at increased risk of severe disease.    You have a medical condition that puts you at increased risk of severe disease.    Someone in your household has a weakened immune system, is at increased risk for severe disease, or is unvaccinated.    You're in an area of high transmission.   Where can I get a COVID-19 vaccine?   Visit [facility]'s website for more information. To find a COVID-19 vaccination site near you, visit www.ImpulseFlyer.gov/  , call [phone]  , or text your zip code to 649096 ([place]). Message and data rates may apply.   What about travel?   Travel increases your risk of exposure to COVID-19. For more information, see www.cdc.gov/coronavirus/2019-ncov/travelers/index.html  .   I've had close contact with someone who has COVID. Do I need to quarantine, and if so, for how long?   For the most current answer, including a calculator to determine whether you need to stay home and for how long, visit www.cdc.gov/coronavirus/2019-ncov/your-health/quarantine-isolation.html  .   I've tested positive for COVID. How long do I need to isolate?   For the latest recommendations, including a calculator to determine how long you need to stay home, visit www.cdc.gov/coronavirus/2019-ncov/your-health/quarantine-isolation.html  .   What if I develop symptoms that might be from COVID?   For the latest recommendations on what to do if you're sick, including when to seek emergency care, visit www.cdc.gov/coronavirus/2019-ncov/if-you-are-sick/steps-when-sick.html  .    Flu vaccine information   Who should get a flu vaccine?   Everyone 6 months of age and older should  get a yearly flu vaccine.   When should I get vaccinated?   You should get a flu vaccine by the end of October. Once you're vaccinated, it takes about two weeks for antibodies to develop and protect you against the flu. That's why it's important to get vaccinated as soon as possible.   After October, is it too late to get vaccinated?   No. You should still get vaccinated. As long as the flu viruses are still in your community, flu vaccines will remain available, even into January of next year or later.   Why do I need a flu vaccine EVERY year?   Flu viruses are constantly changing, so flu vaccines are usually updated from one season to the next. Your protection from the flu vaccine also lessens over time.   Is the flu vaccine safe?   Yes. Over the last 50 years, hundreds of millions of Americans have safely received the flu vaccines.   What are the side effects of flu vaccines?   You CANNOT get the flu from a flu vaccine. Common side effects of the flu shot include soreness, redness and/or swelling where the shot was given, low grade fever, and aches. Common side effects of the nasal spray flu vaccine for adults include runny nose, headaches, sore throat, and cough. For children, side effects include wheezing, vomiting, muscle aches, and fever.   Does the flu vaccine increase your risk of getting COVID-19?   No. There is no evidence that getting a flu vaccine increases your risk of getting COVID-19.   Is it safe to get the flu vaccine along with a COVID-19 vaccine?   Yes. It's safe to get the flu vaccine with a COVID-19 vaccine or booster.   Contact your healthcare provider TODAY for details on when and where to get your flu vaccine.   Your provider   Your diagnosis was provided by Cici August, a member of your trusted care team at [facility].   If you have any questions, call us at [phone]  .

## 2023-11-07 DIAGNOSIS — Z00.00 ANNUAL PHYSICAL EXAM: Primary | ICD-10-CM

## 2023-11-09 LAB
ALBUMIN SERPL-MCNC: 4.9 G/DL (ref 3.5–5.2)
ALBUMIN/GLOB SERPL: 3.1 G/DL
ALP SERPL-CCNC: 48 U/L (ref 39–117)
ALT SERPL-CCNC: 23 U/L (ref 1–41)
APPEARANCE UR: CLEAR
AST SERPL-CCNC: 18 U/L (ref 1–40)
BACTERIA #/AREA URNS HPF: NORMAL /HPF
BASOPHILS # BLD AUTO: 0.01 10*3/MM3 (ref 0–0.2)
BASOPHILS NFR BLD AUTO: 0.2 % (ref 0–1.5)
BILIRUB SERPL-MCNC: 0.8 MG/DL (ref 0–1.2)
BILIRUB UR QL STRIP: NEGATIVE
BUN SERPL-MCNC: 18 MG/DL (ref 6–20)
BUN/CREAT SERPL: 20.9 (ref 7–25)
CALCIUM SERPL-MCNC: 9.5 MG/DL (ref 8.6–10.5)
CASTS URNS QL MICRO: NORMAL /LPF
CHLORIDE SERPL-SCNC: 104 MMOL/L (ref 98–107)
CHOLEST SERPL-MCNC: 177 MG/DL (ref 0–200)
CO2 SERPL-SCNC: 29.2 MMOL/L (ref 22–29)
COLOR UR: YELLOW
CREAT SERPL-MCNC: 0.86 MG/DL (ref 0.76–1.27)
EGFRCR SERPLBLD CKD-EPI 2021: 110.2 ML/MIN/1.73
EOSINOPHIL # BLD AUTO: 0.08 10*3/MM3 (ref 0–0.4)
EOSINOPHIL NFR BLD AUTO: 1.4 % (ref 0.3–6.2)
EPI CELLS #/AREA URNS HPF: NORMAL /HPF (ref 0–10)
ERYTHROCYTE [DISTWIDTH] IN BLOOD BY AUTOMATED COUNT: 12.8 % (ref 12.3–15.4)
GLOBULIN SER CALC-MCNC: 1.6 GM/DL
GLUCOSE SERPL-MCNC: 112 MG/DL (ref 65–99)
GLUCOSE UR QL STRIP: NEGATIVE
HCT VFR BLD AUTO: 44.7 % (ref 37.5–51)
HDLC SERPL-MCNC: 45 MG/DL (ref 40–60)
HGB BLD-MCNC: 15.3 G/DL (ref 13–17.7)
HGB UR QL STRIP: NEGATIVE
IMM GRANULOCYTES # BLD AUTO: 0.02 10*3/MM3 (ref 0–0.05)
IMM GRANULOCYTES NFR BLD AUTO: 0.4 % (ref 0–0.5)
KETONES UR QL STRIP: NEGATIVE
LDLC SERPL CALC-MCNC: 116 MG/DL (ref 0–100)
LDLC/HDLC SERPL: 2.56 {RATIO}
LEUKOCYTE ESTERASE UR QL STRIP: NEGATIVE
LYMPHOCYTES # BLD AUTO: 0.78 10*3/MM3 (ref 0.7–3.1)
LYMPHOCYTES NFR BLD AUTO: 13.7 % (ref 19.6–45.3)
MCH RBC QN AUTO: 28.4 PG (ref 26.6–33)
MCHC RBC AUTO-ENTMCNC: 34.2 G/DL (ref 31.5–35.7)
MCV RBC AUTO: 82.9 FL (ref 79–97)
MICRO URNS: NORMAL
MICRO URNS: NORMAL
MONOCYTES # BLD AUTO: 0.48 10*3/MM3 (ref 0.1–0.9)
MONOCYTES NFR BLD AUTO: 8.5 % (ref 5–12)
NEUTROPHILS # BLD AUTO: 4.31 10*3/MM3 (ref 1.7–7)
NEUTROPHILS NFR BLD AUTO: 75.8 % (ref 42.7–76)
NITRITE UR QL STRIP: NEGATIVE
NRBC BLD AUTO-RTO: 0 /100 WBC (ref 0–0.2)
PH UR STRIP: 6.5 [PH] (ref 5–7.5)
PLATELET # BLD AUTO: 316 10*3/MM3 (ref 140–450)
POTASSIUM SERPL-SCNC: 4.5 MMOL/L (ref 3.5–5.2)
PROT SERPL-MCNC: 6.5 G/DL (ref 6–8.5)
PROT UR QL STRIP: NEGATIVE
RBC # BLD AUTO: 5.39 10*6/MM3 (ref 4.14–5.8)
RBC #/AREA URNS HPF: NORMAL /HPF (ref 0–2)
SODIUM SERPL-SCNC: 139 MMOL/L (ref 136–145)
SP GR UR STRIP: 1.02 (ref 1–1.03)
TRIGL SERPL-MCNC: 85 MG/DL (ref 0–150)
TSH SERPL DL<=0.005 MIU/L-ACNC: 1.28 UIU/ML (ref 0.27–4.2)
URINALYSIS REFLEX: NORMAL
UROBILINOGEN UR STRIP-MCNC: 0.2 MG/DL (ref 0.2–1)
VLDLC SERPL CALC-MCNC: 16 MG/DL (ref 5–40)
WBC # BLD AUTO: 5.68 10*3/MM3 (ref 3.4–10.8)
WBC #/AREA URNS HPF: NORMAL /HPF (ref 0–5)

## 2023-11-22 ENCOUNTER — OFFICE VISIT (OUTPATIENT)
Dept: INTERNAL MEDICINE | Facility: CLINIC | Age: 43
End: 2023-11-22
Payer: COMMERCIAL

## 2023-11-22 VITALS
BODY MASS INDEX: 21.42 KG/M2 | HEIGHT: 71 IN | HEART RATE: 87 BPM | SYSTOLIC BLOOD PRESSURE: 122 MMHG | DIASTOLIC BLOOD PRESSURE: 60 MMHG | WEIGHT: 153 LBS | OXYGEN SATURATION: 99 %

## 2023-11-22 DIAGNOSIS — M19.071 OSTEOARTHRITIS OF FIRST METATARSOPHALANGEAL (MTP) JOINT OF BOTH FEET: ICD-10-CM

## 2023-11-22 DIAGNOSIS — E55.9 AVITAMINOSIS D: ICD-10-CM

## 2023-11-22 DIAGNOSIS — M19.072 OSTEOARTHRITIS OF FIRST METATARSOPHALANGEAL (MTP) JOINT OF BOTH FEET: ICD-10-CM

## 2023-11-22 DIAGNOSIS — R73.01 IFG (IMPAIRED FASTING GLUCOSE): ICD-10-CM

## 2023-11-22 DIAGNOSIS — Z00.01 ENCOUNTER FOR GENERAL ADULT MEDICAL EXAMINATION WITH ABNORMAL FINDINGS: Primary | ICD-10-CM

## 2023-11-22 DIAGNOSIS — D18.09 HEMANGIOMA OF SPINE: ICD-10-CM

## 2023-11-22 DIAGNOSIS — M77.42 METATARSALGIA OF BOTH FEET: ICD-10-CM

## 2023-11-22 DIAGNOSIS — M77.41 METATARSALGIA OF BOTH FEET: ICD-10-CM

## 2023-11-22 DIAGNOSIS — L40.9 PSORIASIS: ICD-10-CM

## 2023-11-22 DIAGNOSIS — M51.36 DDD (DEGENERATIVE DISC DISEASE), LUMBAR: ICD-10-CM

## 2023-11-22 DIAGNOSIS — N42.89 ASYMMETRIC PROSTATE: ICD-10-CM

## 2023-11-22 NOTE — PROGRESS NOTES
"Annual Exam      HPI  Eriberto Cleveland is a 43 y.o. male RTC In yearly CPE, review of medical issues:  Notes 'I am literally the exact same'. \"Same foot issue, same hand spot issue'.   1. Psoriasis, limited disease to B elbow extensor surfaces (resolved), R lower leg (resolved), and B dorsal hands (controlled on topical triamcinolone) - off daily calcipotrene, controlled on elbows, has spots on hands that are minimal.  Uses old creams PRN at this point.  Feels like emollient will help as much as anything at this point.  Never saw derm on this.  Never had repeat on elbows.   2. Bilateral foot pain, focal symmetric 2nd MTP pain s/p MRI and injx with ortho;  failed splinting prior with podiatry - S/P rheum eval with noted negative lab eval and MRI with OA features.  No tx advised but did make mention in note that any response to biologics for psorisis would be interesting.   Pt got second opinion with ortho, Dr. Simon, and had option for surgery for hardware placement.  Recovery time was lengthy and pt is noting getting 'to point I need to do it'.  Very limited in what can do and unable to jog.  Told dx is 'capsulitis'. Feels like has same assessment from 2 different orthos now, so feels like needs to move on surgery.    3. Vitamin D deficiency - off 1000 I.U. Vitamin D3 OTC.  Wonders if need vitamins.  4. HM - needs Flu today; COVID not sure wants to get it.     Review of Systems   Constitutional: Negative for chills and fever.   HENT:  Negative for congestion, hearing loss, odynophagia and sore throat.    Eyes:  Negative for discharge, double vision, pain and redness.        Last eye exam 2023; no issues     Cardiovascular:  Negative for chest pain, dyspnea on exertion, irregular heartbeat, leg swelling, near-syncope, palpitations and syncope.   Respiratory:  Negative for cough and shortness of breath.    Endocrine: Negative for polydipsia, polyphagia and polyuria.   Hematologic/Lymphatic: Negative for bleeding problem. " Does not bruise/bleed easily.   Skin:  Positive for rash. Negative for suspicious lesions.   Musculoskeletal:  Positive for joint pain. Negative for joint swelling, muscle cramps, muscle weakness and myalgias.   Gastrointestinal:  Negative for constipation, diarrhea, dysphagia, heartburn, nausea and vomiting.   Genitourinary:  Positive for nocturia (1x/ nightly). Negative for bladder incontinence, dysuria, frequency, hematuria, hesitancy and incomplete emptying.   Neurological:  Negative for dizziness, headaches and light-headedness.   Psychiatric/Behavioral:  Negative for depression. The patient does not have insomnia and is not nervous/anxious.    Allergic/Immunologic: Negative for environmental allergies and persistent infections.       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       Medical History:    Past Medical History:   Diagnosis Date    Asymmetric prostate     COVID-19 05/2022    Dermatitis, eczematoid 12/27/2016    Description: R >> L arm, recurrent    Dyslipidemia     low hdl diet/exericse mgmnt rec'd    Eczematous dermatitis     R>>L arm recurrent    History of viral meningitis     2012    Lumbar radiculopathy 10/25/2019    Numbness of right anterior thigh 11/15/2018    Prostate nodule     Vitamin D deficiency         Social History:    Social History     Socioeconomic History    Marital status:     Number of children: 2   Tobacco Use    Smoking status: Former     Passive exposure: Never    Smokeless tobacco: Never    Tobacco comments:     8 pk/yr hx quit at age 25   Vaping Use    Vaping Use: Never used   Substance and Sexual Activity    Alcohol use: Yes     Comment: 4-6 drinks month; none since 2020; 2826-5490 1 drink/month    Drug use: No    Sexual activity:  Yes     Partners: Female     Comment: wife only; no hx STD's       Family History:   Family History   Problem Relation Age of Onset    Hyperlipidemia Father     Hypertension Father     Valvular heart disease Father     Hypertension Brother     Crohn's disease Brother     Heart attack Paternal Grandfather     No Known Problems Son     Heart attack Cousin         s/p PTCA    Heart attack Paternal Uncle         s/p CABG x 4       Surgical History: History reviewed. No pertinent surgical history.    No current outpatient medications on file.    Vitals:    11/22/23 1534   BP: 122/60   Pulse: 87   SpO2: 99%     Body mass index is 21.34 kg/m².    Physical Exam  Vitals reviewed.   Constitutional:       General: He is not in acute distress.     Appearance: Normal appearance. He is well-developed. He is not ill-appearing or toxic-appearing.   HENT:      Head: Normocephalic and atraumatic.      Right Ear: Hearing, tympanic membrane, ear canal and external ear normal. There is no impacted cerumen.      Left Ear: Hearing, tympanic membrane, ear canal and external ear normal. There is no impacted cerumen.      Nose: Nose normal.      Mouth/Throat:      Mouth: Mucous membranes are moist. No oral lesions.      Tongue: No lesions.      Pharynx: Oropharynx is clear. Uvula midline. No pharyngeal swelling, oropharyngeal exudate, posterior oropharyngeal erythema or uvula swelling.   Eyes:      General: Lids are normal. No scleral icterus.        Right eye: No discharge.         Left eye: No discharge.      Extraocular Movements: Extraocular movements intact.      Conjunctiva/sclera: Conjunctivae normal.      Pupils: Pupils are equal, round, and reactive to light.   Neck:      Thyroid: No thyroid mass or thyromegaly.      Vascular: No carotid bruit.   Cardiovascular:      Rate and Rhythm: Normal rate and regular rhythm.      Pulses:           Radial pulses are 2+ on the right side and 2+ on the left side.        Dorsalis pedis pulses are  2+ on the right side and 2+ on the left side.        Posterior tibial pulses are 2+ on the right side and 2+ on the left side.      Heart sounds: Normal heart sounds, S1 normal and S2 normal. No murmur heard.     No friction rub. No gallop.   Pulmonary:      Effort: Pulmonary effort is normal. No respiratory distress.      Breath sounds: Normal breath sounds. No wheezing, rhonchi or rales.   Abdominal:      General: Bowel sounds are normal. There is no distension.      Palpations: Abdomen is soft. There is no mass.      Tenderness: There is no abdominal tenderness. There is no guarding or rebound.   Genitourinary:     Prostate: Normal. Not enlarged and not tender.      Rectum: Normal. No external hemorrhoid. Normal anal tone.   Musculoskeletal:         General: No deformity. Normal range of motion.      Right shoulder: No tenderness, bony tenderness or crepitus. Normal range of motion.      Left shoulder: No tenderness, bony tenderness or crepitus. Normal range of motion.      Right hand: No tenderness or bony tenderness. Normal range of motion. Normal strength.      Left hand: No tenderness or bony tenderness. Normal range of motion. Normal strength.      Cervical back: Full passive range of motion without pain, normal range of motion and neck supple.      Right lower leg: No edema.      Left lower leg: No edema.   Lymphadenopathy:      Cervical: No cervical adenopathy.      Right cervical: No superficial, deep or posterior cervical adenopathy.     Left cervical: No superficial, deep or posterior cervical adenopathy.      Upper Body:      Right upper body: No supraclavicular, axillary or pectoral adenopathy.      Left upper body: No supraclavicular, axillary or pectoral adenopathy.   Skin:     General: Skin is warm and dry.      Findings: No rash.      Comments: Very minimal areas of faint erythema on skin at MCP joints, no overt plaques or rash noted.     Neurological:      Mental Status: He is alert and oriented  to person, place, and time.      Cranial Nerves: No cranial nerve deficit.      Sensory: No sensory deficit.      Motor: No weakness, tremor, atrophy or abnormal muscle tone.      Gait: Gait normal.      Deep Tendon Reflexes: Reflexes are normal and symmetric.      Reflex Scores:       Patellar reflexes are 2+ on the right side and 2+ on the left side.       Achilles reflexes are 2+ on the right side and 2+ on the left side.  Psychiatric:         Attention and Perception: Attention normal.         Mood and Affect: Mood normal.         Speech: Speech normal.         Behavior: Behavior normal. Behavior is cooperative.         Thought Content: Thought content normal.         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      Discussion:  Eriberto Cleveland is a 43 y.o. male RTC In yearly CPE, review of medical issues:    1. IFG - noted at 100 last year, now 112. Surprising as pt with excellent diet, exercise, and BMI profile.  Will recheck with A1C.  F/U after labs.   2. Psoriasis, limited disease to B elbow extensor surfaces (resolved), R lower leg (resolved), and B dorsal hands (controlled today) - off daily calcipotrene, using PRN steroid only at this time.  Advised emollient qhs on hands with cold weather for Ppx.  Deferred derm appt in past.   3. Bilateral foot pain, focal symmetric 2nd MTP pain s/p MRI and injx with ortho;  failed splinting prior with  podiatry; S/P rheum eval with noted negative lab eval and MRI with OA features.  No tx advised but did make mention in note that any response to biologics for psorisis would be interesting; s/p second opinion with ortho, Dr. Simon, option for surgery for hardware placement - QOL issue noted.  Pt planning for surgery upcoming due to persistent pain issue.   4. L spine hemangioma - stable on 4/2019 imaging, no additional eval needed.   5. Vitamin D deficiency -  off 1000 I.U. Vitamin D3 OTC.  C/W outdoor exposure. Trend on labs.   6. Prostate Asymmetry on R - s/p urology eval 4/ 2016 and 6/2017.  Minimal on exam today.  Monitor.   7. HM - labs d/w pt; Flu - today; Tdap/ COVID/ Hep A - UTD; COVID booster advised, counseled; ANDERSON OK; C-scope at 45; Hep C Ab (-) 5/2021; c/w exercise    RTC one year CPE, F labs prior

## 2023-11-29 ENCOUNTER — PATIENT MESSAGE (OUTPATIENT)
Dept: INTERNAL MEDICINE | Facility: CLINIC | Age: 43
End: 2023-11-29
Payer: COMMERCIAL

## 2023-11-29 DIAGNOSIS — L71.0 PERIORAL DERMATITIS: Primary | ICD-10-CM

## 2023-11-30 RX ORDER — PIMECROLIMUS 10 MG/G
1 CREAM TOPICAL 2 TIMES DAILY
Qty: 60 G | Refills: 2 | Status: SHIPPED | OUTPATIENT
Start: 2023-11-30

## 2023-11-30 NOTE — TELEPHONE ENCOUNTER
From: Eriberto Cleveland  To: Dada Hernandez  Sent: 11/29/2023 9:03 AM EST  Subject: Chin rash    Dr. Hernandez,    I bet you get some interesting subject lines...    I was just in last week for my physical but have since developed a rash on my chin that wont go away! I have been treating it with hydrocortisone cream but its been there a few days. No real bumps, but redness and some swelling. What do you recommend? Happy to send a pic or do a video chat.    Thank you!  Landon

## 2023-12-04 ENCOUNTER — TELEPHONE (OUTPATIENT)
Dept: INTERNAL MEDICINE | Facility: CLINIC | Age: 43
End: 2023-12-04
Payer: COMMERCIAL

## 2023-12-04 NOTE — TELEPHONE ENCOUNTER
Pt calling stating over the past few years Dr. Hernandez has seen him for a rash on his face that appears every so often and in the past Dr. Hernandez has prescribed some type of ointment- pt states the rash is starting to burn and is becoming very uncomfortable- Pt only wants to see Dr. Hernandez but advised pt that Dr. Hernandez is completely booked until after Christmas but I would send a message to see if there was somewhere he could be fit in or if there is another way he can be advised - please advise

## 2023-12-07 ENCOUNTER — OFFICE VISIT (OUTPATIENT)
Dept: INTERNAL MEDICINE | Facility: CLINIC | Age: 43
End: 2023-12-07
Payer: COMMERCIAL

## 2023-12-07 VITALS
HEIGHT: 71 IN | DIASTOLIC BLOOD PRESSURE: 72 MMHG | HEART RATE: 70 BPM | OXYGEN SATURATION: 98 % | BODY MASS INDEX: 21.82 KG/M2 | WEIGHT: 155.9 LBS | TEMPERATURE: 97.6 F | SYSTOLIC BLOOD PRESSURE: 112 MMHG

## 2023-12-07 DIAGNOSIS — L21.9 ACUTE SEBORRHEIC DERMATITIS: Primary | ICD-10-CM

## 2023-12-07 PROCEDURE — 99214 OFFICE O/P EST MOD 30 MIN: CPT | Performed by: INTERNAL MEDICINE

## 2023-12-07 RX ORDER — DIAPER,BRIEF,INFANT-TODD,DISP
1 EACH MISCELLANEOUS 2 TIMES DAILY
Qty: 30 G | Refills: 1 | Status: SHIPPED | OUTPATIENT
Start: 2023-12-07

## 2023-12-07 RX ORDER — KETOCONAZOLE 20 MG/G
1 CREAM TOPICAL 2 TIMES DAILY
Qty: 30 G | Refills: 1 | Status: SHIPPED | OUTPATIENT
Start: 2023-12-07

## 2023-12-07 NOTE — PROGRESS NOTES
Rash (On face and behind the ears, with pain and swelling.)      ROCK Cleveland is a 43 y.o. male RTC In acute care:   Called here day after CPE last week, started with burning and redness on chin day after visit.  DId not get Elidel sent in due to insurance issues and in meantime has spread to face and back to around ears.  Not in ears that aware of.  Has 'redness and burning'.  Has 'flaking' with it and noted even had some on eyelids as well.   Nasolabial folds, eyelids, and chin worst areas.  Is 'on ears, not in them'.    Is looking some better today.    No change in diet, 'i eat and behave like a robot'.  No new products. Uses 'unscented deodorant and I use Aveeno products on my face'.    No other new non-derm issues.     Tried some HCT cream and did not seem to help.  Uses Aquaphor to 'keep from burning'.         Answers submitted by the patient for this visit:  Primary Reason for Visit (Submitted on 12/5/2023)  What is the primary reason for your visit?: Rash  Rash Questionnaire (Submitted on 12/5/2023)  Chief Complaint: Rash  Chronicity: recurrent  Onset: in the past 7 days  Progression since onset: waxing and waning  Affected locations: face, neck, chest  Characteristics: burning, dryness, pain, redness, itchiness  Exposed to: nothing  facial edema: Yes  fatigue: Yes  rhinorrhea: No    Review of Systems   Constitutional: Negative for fever.   HENT:  Negative for congestion and sore throat.    Eyes:  Negative for pain.   Respiratory:  Negative for cough and shortness of breath.    Skin:  Positive for rash. Negative for nail changes.   Musculoskeletal:  Negative for joint pain.   Gastrointestinal:  Negative for anorexia, diarrhea and vomiting.       The following portions of the patient's history were reviewed and updated as appropriate: allergies, current medications, past medical history, past social history, and problem list.      Current Outpatient Medications:     hydrocortisone 1 % cream, Apply 1  "application  topically to the appropriate area as directed 2 (Two) Times a Day. Up to 2 weeks, Disp: 30 g, Rfl: 1    ketoconazole (NIZORAL) 2 % cream, Apply 1 application  topically to the appropriate area as directed 2 (Two) Times a Day., Disp: 30 g, Rfl: 1    pimecrolimus (ELIDEL) 1 % cream, Apply 1 application  topically to the appropriate area as directed 2 (Two) Times a Day., Disp: 60 g, Rfl: 2    Vitals:    12/07/23 1533   BP: 112/72   BP Location: Left arm   Patient Position: Sitting   Cuff Size: Adult   Pulse: 70   Temp: 97.6 °F (36.4 °C)   TempSrc: Infrared   SpO2: 98%   Weight: 70.7 kg (155 lb 14.4 oz)   Height: 180.3 cm (70.98\")     Body mass index is 21.75 kg/m².      Physical Exam  Vitals reviewed.   Constitutional:       General: He is not in acute distress.     Appearance: He is well-developed. He is not ill-appearing or toxic-appearing.   HENT:      Head: Normocephalic.      Right Ear: External ear normal.      Left Ear: External ear normal.   Pulmonary:      Effort: Pulmonary effort is normal. No respiratory distress.   Musculoskeletal:      Cervical back: Normal range of motion and neck supple.   Lymphadenopathy:      Cervical: No cervical adenopathy.   Skin:     Findings: Rash (scattered areas of faint erythema with variable confluence with slight raised element.  Scant flaking noted periorally, greasy scale) present. No bruising, ecchymosis or petechiae.   Neurological:      Mental Status: He is alert and oriented to person, place, and time.      Cranial Nerves: No dysarthria or facial asymmetry.      Gait: Gait normal.   Psychiatric:         Behavior: Behavior normal.         Thought Content: Thought content normal.         Assessment/ Plan  Diagnoses and all orders for this visit:    Acute seborrheic dermatitis  -     ketoconazole (NIZORAL) 2 % cream; Apply 1 application  topically to the appropriate area as directed 2 (Two) Times a Day.  -     hydrocortisone 1 % cream; Apply 1 application  " topically to the appropriate area as directed 2 (Two) Times a Day. Up to 2 weeks        Return for Next scheduled follow up.      Discussion:  Eriberto Cleveland is a 43 y.o. male with hx of limited psoriasis RTC In acute care (new issue to examiner) with one week of acute onset facial redness, burning, and scaling focused at nasolabial folds, perioral region, eyelids, malar region, and external ears.  Exam with faint changes only today, but pt hx suggests more pronounced redness and scaling over week.  Suspicious for seborrheic dermatitis facial.  Ddx includes thor oral dermatitis vs. Less likely contact dermatitis vs. Less likely malar rash  - ketoconazole cream BID  - HCT 1% cream BID x 2 weeks max  - Call if not better.

## 2023-12-13 DIAGNOSIS — Z00.00 HEALTH MAINTENANCE EXAMINATION: ICD-10-CM

## 2023-12-13 DIAGNOSIS — E55.9 AVITAMINOSIS D: Primary | ICD-10-CM

## 2023-12-15 LAB
25(OH)D3+25(OH)D2 SERPL-MCNC: 29.4 NG/ML (ref 30–100)
ALBUMIN SERPL-MCNC: 4.8 G/DL (ref 3.5–5.2)
ALBUMIN/GLOB SERPL: 2.4 G/DL
ALP SERPL-CCNC: 55 U/L (ref 39–117)
ALT SERPL-CCNC: 37 U/L (ref 1–41)
APPEARANCE UR: CLEAR
AST SERPL-CCNC: 22 U/L (ref 1–40)
BACTERIA #/AREA URNS HPF: NORMAL /HPF
BASOPHILS # BLD AUTO: 0.02 10*3/MM3 (ref 0–0.2)
BASOPHILS NFR BLD AUTO: 0.4 % (ref 0–1.5)
BILIRUB SERPL-MCNC: 0.5 MG/DL (ref 0–1.2)
BILIRUB UR QL STRIP: NEGATIVE
BUN SERPL-MCNC: 17 MG/DL (ref 6–20)
BUN/CREAT SERPL: 20.5 (ref 7–25)
CALCIUM SERPL-MCNC: 9.1 MG/DL (ref 8.6–10.5)
CASTS URNS QL MICRO: NORMAL /LPF
CHLORIDE SERPL-SCNC: 104 MMOL/L (ref 98–107)
CHOLEST SERPL-MCNC: 188 MG/DL (ref 0–200)
CO2 SERPL-SCNC: 27.5 MMOL/L (ref 22–29)
COLOR UR: YELLOW
CREAT SERPL-MCNC: 0.83 MG/DL (ref 0.76–1.27)
EGFRCR SERPLBLD CKD-EPI 2021: 111.4 ML/MIN/1.73
EOSINOPHIL # BLD AUTO: 0.05 10*3/MM3 (ref 0–0.4)
EOSINOPHIL NFR BLD AUTO: 0.9 % (ref 0.3–6.2)
EPI CELLS #/AREA URNS HPF: NORMAL /HPF (ref 0–10)
ERYTHROCYTE [DISTWIDTH] IN BLOOD BY AUTOMATED COUNT: 13.2 % (ref 12.3–15.4)
GLOBULIN SER CALC-MCNC: 2 GM/DL
GLUCOSE SERPL-MCNC: 113 MG/DL (ref 65–99)
GLUCOSE UR QL STRIP: NEGATIVE
HCT VFR BLD AUTO: 46.9 % (ref 37.5–51)
HDLC SERPL-MCNC: 50 MG/DL (ref 40–60)
HGB BLD-MCNC: 16.2 G/DL (ref 13–17.7)
HGB UR QL STRIP: NEGATIVE
IMM GRANULOCYTES # BLD AUTO: 0.01 10*3/MM3 (ref 0–0.05)
IMM GRANULOCYTES NFR BLD AUTO: 0.2 % (ref 0–0.5)
KETONES UR QL STRIP: NEGATIVE
LDLC SERPL CALC-MCNC: 125 MG/DL (ref 0–100)
LEUKOCYTE ESTERASE UR QL STRIP: NEGATIVE
LYMPHOCYTES # BLD AUTO: 0.78 10*3/MM3 (ref 0.7–3.1)
LYMPHOCYTES NFR BLD AUTO: 14.8 % (ref 19.6–45.3)
MCH RBC QN AUTO: 29.2 PG (ref 26.6–33)
MCHC RBC AUTO-ENTMCNC: 34.5 G/DL (ref 31.5–35.7)
MCV RBC AUTO: 84.7 FL (ref 79–97)
MICRO URNS: NORMAL
MICRO URNS: NORMAL
MONOCYTES # BLD AUTO: 0.49 10*3/MM3 (ref 0.1–0.9)
MONOCYTES NFR BLD AUTO: 9.3 % (ref 5–12)
NEUTROPHILS # BLD AUTO: 3.92 10*3/MM3 (ref 1.7–7)
NEUTROPHILS NFR BLD AUTO: 74.4 % (ref 42.7–76)
NITRITE UR QL STRIP: NEGATIVE
NRBC BLD AUTO-RTO: 0 /100 WBC (ref 0–0.2)
PH UR STRIP: 7 [PH] (ref 5–7.5)
PLATELET # BLD AUTO: 308 10*3/MM3 (ref 140–450)
POTASSIUM SERPL-SCNC: 4.3 MMOL/L (ref 3.5–5.2)
PROT SERPL-MCNC: 6.8 G/DL (ref 6–8.5)
PROT UR QL STRIP: NEGATIVE
RBC # BLD AUTO: 5.54 10*6/MM3 (ref 4.14–5.8)
RBC #/AREA URNS HPF: NORMAL /HPF (ref 0–2)
SODIUM SERPL-SCNC: 142 MMOL/L (ref 136–145)
SP GR UR STRIP: 1.02 (ref 1–1.03)
TRIGL SERPL-MCNC: 69 MG/DL (ref 0–150)
TSH SERPL DL<=0.005 MIU/L-ACNC: 1.4 UIU/ML (ref 0.27–4.2)
URINALYSIS REFLEX: NORMAL
UROBILINOGEN UR STRIP-MCNC: 0.2 MG/DL (ref 0.2–1)
VLDLC SERPL CALC-MCNC: 13 MG/DL (ref 5–40)
WBC # BLD AUTO: 5.27 10*3/MM3 (ref 3.4–10.8)
WBC #/AREA URNS HPF: NORMAL /HPF (ref 0–5)

## 2023-12-26 DIAGNOSIS — R73.09 ELEVATED GLUCOSE: Primary | ICD-10-CM

## 2023-12-29 ENCOUNTER — PATIENT MESSAGE (OUTPATIENT)
Dept: INTERNAL MEDICINE | Facility: CLINIC | Age: 43
End: 2023-12-29
Payer: COMMERCIAL

## 2023-12-29 NOTE — TELEPHONE ENCOUNTER
"From: Eriberto Cleveland  To: Dada Hernandez  Sent: 12/29/2023 9:19 AM EST  Subject: High Glucose with normal a1c    Dr. Hernandez,    I received your message that the most recent blood work was good and likely ruled out diabetes.     What are the next steps in finding the cause? Is this Hyperglycemia and if so what should I do to minimize? This actually makes sense, I dont miss meals and snack because i drop in energy level fast.     My cousin and uncles have all had major heart \"issues\", stints and bypasses for all, in their 40's and 50's, so I am sensitive to that predisposition. thanks for helping me figure this out.    Landon"

## 2024-03-21 ENCOUNTER — OFFICE VISIT (OUTPATIENT)
Dept: INTERNAL MEDICINE | Facility: CLINIC | Age: 44
End: 2024-03-21

## 2024-03-21 VITALS
BODY MASS INDEX: 22.09 KG/M2 | SYSTOLIC BLOOD PRESSURE: 102 MMHG | HEART RATE: 73 BPM | OXYGEN SATURATION: 98 % | HEIGHT: 71 IN | WEIGHT: 157.8 LBS | DIASTOLIC BLOOD PRESSURE: 64 MMHG | TEMPERATURE: 98 F

## 2024-03-21 DIAGNOSIS — L71.9 ROSACEA: Primary | ICD-10-CM

## 2024-03-21 DIAGNOSIS — D23.9 DERMAL NEVUS: ICD-10-CM

## 2024-03-21 DIAGNOSIS — L40.9 PSORIASIS: ICD-10-CM

## 2024-03-21 PROCEDURE — 99214 OFFICE O/P EST MOD 30 MIN: CPT | Performed by: INTERNAL MEDICINE

## 2024-03-21 RX ORDER — METRONIDAZOLE 10 MG/G
GEL TOPICAL DAILY
Qty: 55 G | Refills: 2 | Status: SHIPPED | OUTPATIENT
Start: 2024-03-21

## 2024-03-21 NOTE — PROGRESS NOTES
"Spot in scalp (Front behind hairline)      HPI  Eriberto Cleveland is a 43 y.o. male RTC in acute care:   \"My hairdresser noted a mole on head'.  Thought was new, not seen in past. PT was unaware, no pain.  Has hard time finding self.     Seb derm on face. 'It got better\".  Feels like the ketoconazole cream did not help much. Hct cream seemed to resolve issue.  Still has redness on face, no scaling. No specific flare trigger noted.  Does not think allergy related. No spicy food trigger.  Sunlight will make worse with more redness (no itching).   Notes over malar region. Has some bumps onface now as well, new from past.     Has had minimal issues with psoriatic lesions recently, small spot on L medial dorsal hand noted as only issue at this time.     Answers submitted by the patient for this visit:  Primary Reason for Visit (Submitted on 3/15/2024)  What is the primary reason for your visit?: Rash  Rash Questionnaire (Submitted on 3/15/2024)  Chief Complaint: Rash  Chronicity: new  Onset: 1 to 4 weeks ago  Progression since onset: unchanged  Exposed to: nothing  facial edema: No  fatigue: No  rhinorrhea: No    Review of Systems   Constitutional: Negative for fever.   HENT:  Negative for congestion and sore throat.    Respiratory:  Negative for cough and shortness of breath.    Skin:  Positive for rash and suspicious lesions (on scalp). Negative for nail changes.   Musculoskeletal:  Negative for joint pain.   Gastrointestinal:  Negative for anorexia, diarrhea and vomiting.       The following portions of the patient's history were reviewed and updated as appropriate: allergies, current medications, past medical history, past social history, and problem list.      Current Outpatient Medications:     hydrocortisone 1 % cream, Apply 1 application  topically to the appropriate area as directed 2 (Two) Times a Day. Up to 2 weeks (Patient not taking: Reported on 3/21/2024), Disp: 30 g, Rfl: 1    metroNIDAZOLE (METROGEL) 1 % gel, " "Apply  topically to the appropriate area as directed Daily., Disp: 55 g, Rfl: 2    Vitals:    03/21/24 0946   BP: 102/64   BP Location: Left arm   Patient Position: Sitting   Cuff Size: Adult   Pulse: 73   Temp: 98 °F (36.7 °C)   TempSrc: Infrared   SpO2: 98%   Weight: 71.6 kg (157 lb 12.8 oz)   Height: 180.3 cm (70.98\")     Body mass index is 22.02 kg/m².      Physical Exam  Vitals reviewed.   Constitutional:       General: He is not in acute distress.     Appearance: He is well-developed. He is not ill-appearing or toxic-appearing.   HENT:      Head: Normocephalic.   Pulmonary:      Effort: Pulmonary effort is normal. No respiratory distress.   Skin:     Findings: Erythema (generalized, blotchy erythema in malar distribution with scattered closed comedones paranasal noted.; No scaling noted today.), lesion (~4mm tan to pink circular papular lesion with crisp borders and normal hair growth/ active follice distribution on top of head/ frontal scalp.) and rash (focal lichenified, slightly scaly plaque lesion (0.5cm) on medial dorsal L hand) present. No abrasion, petechiae or wound.   Neurological:      Mental Status: He is alert and oriented to person, place, and time.   Psychiatric:         Behavior: Behavior normal.         Thought Content: Thought content normal.         Assessment/ Plan  Diagnoses and all orders for this visit:    Rosacea  -     metroNIDAZOLE (METROGEL) 1 % gel; Apply  topically to the appropriate area as directed Daily.    Dermal nevus    Psoriasis        Return for Next scheduled follow up.      Discussion:  Eriberto Cleveland is a 43 y.o. male RTC in acute care:   1. Dermal nevus - on scalp, benign appearance with normal hair distribution/ follicle activity and crisp borders on exam. Reassured. Will monitor.   2. Seb derm on face vs. Rosacea - dx seb derm last visti with noted erythema with scaling at time. Did not respond fully to topical antifungal agent.  Now with predominantly malar region " erythema with rare telangiectasias and closed comedones on face.   Provisional dx rosacea today, cannot r/o past seb derm overlay.  Start metronidazole topically daily.  Facial lotion with SPF daily in AM. Pt will update on progress, consider oral Abx if not fully responding. [facility] derm referral for non-response.   3. Limited psoriasis, limited disease to B elbow extensor surfaces (resolved), R lower leg (resolved), and B dorsal hands (controlled today) - off daily calcipotrene, using PRN steroid only at this time.  Monitor.     RTC in as planned.

## 2024-04-09 ENCOUNTER — PATIENT MESSAGE (OUTPATIENT)
Dept: INTERNAL MEDICINE | Facility: CLINIC | Age: 44
End: 2024-04-09
Payer: COMMERCIAL

## 2024-04-09 DIAGNOSIS — L71.9 ROSACEA: ICD-10-CM

## 2024-04-09 DIAGNOSIS — T75.3XXA MOTION SICKNESS, INITIAL ENCOUNTER: ICD-10-CM

## 2024-04-09 DIAGNOSIS — L40.9 PSORIASIS: Primary | ICD-10-CM

## 2024-04-09 RX ORDER — SCOLOPAMINE TRANSDERMAL SYSTEM 1 MG/1
1 PATCH, EXTENDED RELEASE TRANSDERMAL
Qty: 2 EACH | Refills: 0 | Status: SHIPPED | OUTPATIENT
Start: 2024-04-09

## 2024-04-09 NOTE — TELEPHONE ENCOUNTER
From: Eriberto Cleveland  To: Dada Hernandez  Sent: 4/9/2024 8:16 AM EDT  Subject: Rash Follow up    Dr. Hernandez,    The ointment helped but didnt not alleviate the itching and redness. I believe you wanted me to follow up about a dermatologist referral.    Additionally, I am going on a off shore fishing trip for 4 nights. I wanted to know if you could send a prescription for motion sickness? I am prone...    Thank you!!  Landon

## 2025-04-15 DIAGNOSIS — T75.3XXA MOTION SICKNESS, INITIAL ENCOUNTER: ICD-10-CM

## 2025-04-15 RX ORDER — SCOPOLAMINE 1 MG/3D
1 PATCH, EXTENDED RELEASE TRANSDERMAL
Qty: 2 EACH | Refills: 0 | Status: CANCELLED | OUTPATIENT
Start: 2025-04-15

## 2025-04-17 ENCOUNTER — PATIENT MESSAGE (OUTPATIENT)
Dept: INTERNAL MEDICINE | Facility: CLINIC | Age: 45
End: 2025-04-17
Payer: COMMERCIAL

## 2025-04-17 DIAGNOSIS — T75.3XXA MOTION SICKNESS, INITIAL ENCOUNTER: ICD-10-CM

## 2025-04-17 RX ORDER — SCOPOLAMINE 1 MG/3D
1 PATCH, EXTENDED RELEASE TRANSDERMAL
Qty: 2 EACH | Refills: 0 | Status: SHIPPED | OUTPATIENT
Start: 2025-04-17

## 2025-04-17 RX ORDER — SCOPOLAMINE 1 MG/3D
1 PATCH, EXTENDED RELEASE TRANSDERMAL
Qty: 2 EACH | Refills: 0 | Status: CANCELLED | OUTPATIENT
Start: 2025-04-17

## 2025-05-30 ENCOUNTER — PATIENT MESSAGE (OUTPATIENT)
Dept: INTERNAL MEDICINE | Facility: CLINIC | Age: 45
End: 2025-05-30
Payer: COMMERCIAL